# Patient Record
Sex: FEMALE | Race: OTHER | HISPANIC OR LATINO | URBAN - METROPOLITAN AREA
[De-identification: names, ages, dates, MRNs, and addresses within clinical notes are randomized per-mention and may not be internally consistent; named-entity substitution may affect disease eponyms.]

---

## 2018-06-25 ENCOUNTER — EMERGENCY (EMERGENCY)
Facility: HOSPITAL | Age: 83
LOS: 1 days | Discharge: HOME | End: 2018-06-25
Attending: EMERGENCY MEDICINE
Payer: MEDICAID

## 2018-06-25 VITALS
RESPIRATION RATE: 16 BRPM | HEART RATE: 78 BPM | SYSTOLIC BLOOD PRESSURE: 129 MMHG | TEMPERATURE: 98 F | DIASTOLIC BLOOD PRESSURE: 68 MMHG | OXYGEN SATURATION: 98 %

## 2018-06-25 VITALS
OXYGEN SATURATION: 96 % | RESPIRATION RATE: 20 BRPM | SYSTOLIC BLOOD PRESSURE: 117 MMHG | HEART RATE: 83 BPM | DIASTOLIC BLOOD PRESSURE: 63 MMHG | TEMPERATURE: 99 F

## 2018-06-25 DIAGNOSIS — Z95.0 PRESENCE OF CARDIAC PACEMAKER: Chronic | ICD-10-CM

## 2018-06-25 LAB
ALBUMIN SERPL ELPH-MCNC: 4.2 G/DL — SIGNIFICANT CHANGE UP (ref 3.3–5.2)
ALP SERPL-CCNC: 81 U/L — SIGNIFICANT CHANGE UP (ref 40–120)
ALT FLD-CCNC: 7 U/L — SIGNIFICANT CHANGE UP
ANION GAP SERPL CALC-SCNC: 17 MMOL/L — SIGNIFICANT CHANGE UP (ref 5–17)
APTT BLD: 28 SEC — SIGNIFICANT CHANGE UP (ref 27.5–37.4)
AST SERPL-CCNC: 16 U/L — SIGNIFICANT CHANGE UP
BASOPHILS # BLD AUTO: 0 K/UL — SIGNIFICANT CHANGE UP (ref 0–0.2)
BASOPHILS NFR BLD AUTO: 0.1 % — SIGNIFICANT CHANGE UP (ref 0–2)
BILIRUB SERPL-MCNC: 0.5 MG/DL — SIGNIFICANT CHANGE UP (ref 0.4–2)
BUN SERPL-MCNC: 12 MG/DL — SIGNIFICANT CHANGE UP (ref 8–20)
CALCIUM SERPL-MCNC: 9.3 MG/DL — SIGNIFICANT CHANGE UP (ref 8.6–10.2)
CHLORIDE SERPL-SCNC: 99 MMOL/L — SIGNIFICANT CHANGE UP (ref 98–107)
CO2 SERPL-SCNC: 22 MMOL/L — SIGNIFICANT CHANGE UP (ref 22–29)
CREAT SERPL-MCNC: 0.56 MG/DL — SIGNIFICANT CHANGE UP (ref 0.5–1.3)
EOSINOPHIL # BLD AUTO: 0 K/UL — SIGNIFICANT CHANGE UP (ref 0–0.5)
EOSINOPHIL NFR BLD AUTO: 0.2 % — SIGNIFICANT CHANGE UP (ref 0–6)
GLUCOSE SERPL-MCNC: 102 MG/DL — SIGNIFICANT CHANGE UP (ref 70–115)
HCT VFR BLD CALC: 35 % — LOW (ref 37–47)
HGB BLD-MCNC: 11.5 G/DL — LOW (ref 12–16)
INR BLD: 0.99 RATIO — SIGNIFICANT CHANGE UP (ref 0.88–1.16)
LYMPHOCYTES # BLD AUTO: 0.8 K/UL — LOW (ref 1–4.8)
LYMPHOCYTES # BLD AUTO: 7.4 % — LOW (ref 20–55)
MCHC RBC-ENTMCNC: 29.3 PG — SIGNIFICANT CHANGE UP (ref 27–31)
MCHC RBC-ENTMCNC: 32.9 G/DL — SIGNIFICANT CHANGE UP (ref 32–36)
MCV RBC AUTO: 89.1 FL — SIGNIFICANT CHANGE UP (ref 81–99)
MONOCYTES # BLD AUTO: 1 K/UL — HIGH (ref 0–0.8)
MONOCYTES NFR BLD AUTO: 8.5 % — SIGNIFICANT CHANGE UP (ref 3–10)
NEUTROPHILS # BLD AUTO: 9.5 K/UL — HIGH (ref 1.8–8)
NEUTROPHILS NFR BLD AUTO: 83.6 % — HIGH (ref 37–73)
PLATELET # BLD AUTO: 301 K/UL — SIGNIFICANT CHANGE UP (ref 150–400)
POTASSIUM SERPL-MCNC: 4.2 MMOL/L — SIGNIFICANT CHANGE UP (ref 3.5–5.3)
POTASSIUM SERPL-SCNC: 4.2 MMOL/L — SIGNIFICANT CHANGE UP (ref 3.5–5.3)
PROT SERPL-MCNC: 7.4 G/DL — SIGNIFICANT CHANGE UP (ref 6.6–8.7)
PROTHROM AB SERPL-ACNC: 10.9 SEC — SIGNIFICANT CHANGE UP (ref 9.8–12.7)
RBC # BLD: 3.93 M/UL — LOW (ref 4.4–5.2)
RBC # FLD: 14.9 % — SIGNIFICANT CHANGE UP (ref 11–15.6)
SODIUM SERPL-SCNC: 138 MMOL/L — SIGNIFICANT CHANGE UP (ref 135–145)
TROPONIN T SERPL-MCNC: <0.01 NG/ML — SIGNIFICANT CHANGE UP (ref 0–0.06)
TROPONIN T SERPL-MCNC: <0.01 NG/ML — SIGNIFICANT CHANGE UP (ref 0–0.06)
WBC # BLD: 11.4 K/UL — HIGH (ref 4.8–10.8)
WBC # FLD AUTO: 11.4 K/UL — HIGH (ref 4.8–10.8)

## 2018-06-25 PROCEDURE — 71046 X-RAY EXAM CHEST 2 VIEWS: CPT | Mod: 26

## 2018-06-25 PROCEDURE — 99285 EMERGENCY DEPT VISIT HI MDM: CPT

## 2018-06-25 PROCEDURE — T1013: CPT

## 2018-06-25 PROCEDURE — 96374 THER/PROPH/DIAG INJ IV PUSH: CPT

## 2018-06-25 PROCEDURE — 99284 EMERGENCY DEPT VISIT MOD MDM: CPT | Mod: 25

## 2018-06-25 PROCEDURE — 80053 COMPREHEN METABOLIC PANEL: CPT

## 2018-06-25 PROCEDURE — 85730 THROMBOPLASTIN TIME PARTIAL: CPT

## 2018-06-25 PROCEDURE — 85610 PROTHROMBIN TIME: CPT

## 2018-06-25 PROCEDURE — 93005 ELECTROCARDIOGRAM TRACING: CPT

## 2018-06-25 PROCEDURE — 84484 ASSAY OF TROPONIN QUANT: CPT

## 2018-06-25 PROCEDURE — 71046 X-RAY EXAM CHEST 2 VIEWS: CPT

## 2018-06-25 PROCEDURE — 93010 ELECTROCARDIOGRAM REPORT: CPT | Mod: 76

## 2018-06-25 PROCEDURE — 36415 COLL VENOUS BLD VENIPUNCTURE: CPT

## 2018-06-25 PROCEDURE — 85027 COMPLETE CBC AUTOMATED: CPT

## 2018-06-25 RX ORDER — LIDOCAINE 4 G/100G
10 CREAM TOPICAL ONCE
Qty: 0 | Refills: 0 | Status: COMPLETED | OUTPATIENT
Start: 2018-06-25 | End: 2018-06-25

## 2018-06-25 RX ORDER — ACETAMINOPHEN 500 MG
975 TABLET ORAL ONCE
Qty: 0 | Refills: 0 | Status: COMPLETED | OUTPATIENT
Start: 2018-06-25 | End: 2018-06-25

## 2018-06-25 RX ORDER — OMEPRAZOLE 10 MG/1
1 CAPSULE, DELAYED RELEASE ORAL
Qty: 30 | Refills: 0 | OUTPATIENT
Start: 2018-06-25 | End: 2018-07-24

## 2018-06-25 RX ORDER — ASPIRIN/CALCIUM CARB/MAGNESIUM 324 MG
324 TABLET ORAL ONCE
Qty: 0 | Refills: 0 | Status: DISCONTINUED | OUTPATIENT
Start: 2018-06-25 | End: 2018-06-25

## 2018-06-25 RX ORDER — SODIUM CHLORIDE 9 MG/ML
3 INJECTION INTRAMUSCULAR; INTRAVENOUS; SUBCUTANEOUS ONCE
Qty: 0 | Refills: 0 | Status: COMPLETED | OUTPATIENT
Start: 2018-06-25 | End: 2018-06-25

## 2018-06-25 RX ORDER — PANTOPRAZOLE SODIUM 20 MG/1
40 TABLET, DELAYED RELEASE ORAL ONCE
Qty: 0 | Refills: 0 | Status: COMPLETED | OUTPATIENT
Start: 2018-06-25 | End: 2018-06-25

## 2018-06-25 RX ADMIN — PANTOPRAZOLE SODIUM 40 MILLIGRAM(S): 20 TABLET, DELAYED RELEASE ORAL at 12:22

## 2018-06-25 RX ADMIN — Medication 30 MILLILITER(S): at 12:22

## 2018-06-25 RX ADMIN — LIDOCAINE 10 MILLILITER(S): 4 CREAM TOPICAL at 12:22

## 2018-06-25 RX ADMIN — Medication 975 MILLIGRAM(S): at 12:23

## 2018-06-25 RX ADMIN — SODIUM CHLORIDE 3 MILLILITER(S): 9 INJECTION INTRAMUSCULAR; INTRAVENOUS; SUBCUTANEOUS at 12:27

## 2018-06-25 NOTE — ED ADULT NURSE NOTE - PMH
CAD (coronary artery disease), native coronary artery  [facility] of 9/24/2014: mLAD 40% (FFR 0.89), OM3 30%, pRCA 20%  Chronic diastolic heart failure    Depression    Hypertension    PAF (paroxysmal atrial fibrillation)    Respiratory infection

## 2018-06-25 NOTE — ED PROVIDER NOTE - PHYSICAL EXAMINATION
VITALS: reviewed  GEN: NAD, A & O x 4  HEAD/EYES: NCAT, PERRL, EOMI, anicteric sclerae, no conjunctival pallor  ENT: mucus membranes moist, oropharynx WNL, trachea midline, no JVD  RESP: lungs CTA with equal breath sounds bilaterally, chest wall nontender and atraumatic. no respiratory distress  CV: heart with reg rhythm S1, S2, no murmur; distal pulses intact and symmetric bilaterally  ABDOMEN: minimal epigastric tenderness  : no CVAT  MSK: extremities atraumatic and nontender, no edema, no asymmetry. the back is without midline or lateral tenderness, there is no spinal deformity or stepoff and the back is ranged painlessly. the neck has no midline tenderness, deformity, or stepoff, and is ranged painlessly. chest wall tenderness reproducible with palpation and movement  SKIN: warm, dry, no rash, no bruising, no cyanosis. color appropriate for ethnicity. pacemaker right upper chest. no sternotomy scar  NEURO: alert, mentating appropriately, no facial asymmetry. gross sensation, motor, coordination are intact  PSYCH: Affect appropriate VITALS: reviewed and reassuring  GEN: NAD, A & O x 4  HEAD/EYES: NCAT, PERRL, EOMI, anicteric sclerae, no conjunctival pallor  ENT: mucus membranes moist, oropharynx WNL, trachea midline, no JVD  RESP: lungs CTA with equal breath sounds bilaterally, chest wall diffusely tender reproducing pain of complaint, no rash or signs of trauma, no respiratory distress  CV: heart with reg rhythm S1, S2, no murmur; distal pulses intact and symmetric bilaterally  ABDOMEN: minimal epigastric tenderness, soft, no rebound, no guarding, negative sharma's sign, no masses  : no CVAT  MSK: extremities atraumatic and nontender, no edema, no asymmetry. the back is without midline or lateral tenderness, there is no spinal deformity or stepoff and the back is ranged painlessly. the neck has no midline tenderness, deformity, or stepoff, and is ranged painlessly. chest wall tenderness reproducible with palpation and movement  SKIN: warm, dry, no rash, no bruising, no cyanosis. color appropriate for ethnicity. pacemaker right upper chest. no sternotomy scar  NEURO: alert, mentating appropriately, no facial asymmetry. gross sensation, motor, coordination are intact  PSYCH: Affect appropriate

## 2018-06-25 NOTE — ED ADULT NURSE NOTE - OBJECTIVE STATEMENT
Patient c/o right sided chest and head pain, nausea.  Initially felt epigastric pain, the complained of chest pain/dizziness. "Saw everything black".  A&Ox3, no respiratory distress, respirations even and unlabored.

## 2018-06-25 NOTE — CONSULT NOTE ADULT - SUBJECTIVE AND OBJECTIVE BOX
[place] CARDIOLOGY/EP                                                        [facility]                                                             Office: [address] [address]                                                            Telephone: [phone]. Fax:[phone]          Patient is a 88y old  Female who presents with a chief complaint of     HPI:  87 y/o F pt with hx of CAD ( non-obstructive CAD on cardiac cath 2014) , heart failure, h/o MDT pacemaker 2015 with subsequent PPM pocket infection resulting in left system explant (MRSA +) and subsequent re-implant on right side ( patient with limited cardiology follow-up)  , HTN, PAF presents to ED c/o abdominal pain and then chest pain today. While she was in the kitchen today he felt dizzy and everything went black ( for a few seconds . Patient denies post-ictal confusion , bowel or bladder incontinence    Pt currently c/o headache. Pt ate today. Denies fever, chills, diarrhea, melena      Patient denies orthopnea PND, LE edema , syncope or pre-syncope  Functional status > 10 mets  No limitations with AODL  Co-morbid: (-) DM, (_) CVA, (_) COPD (-) PE (-) DVT (-) Bleeding or clotting disorders  ECG: AsVp with ns st twa  no phenotypic evidence of Brs, HCM ,LQTS      PAST MEDICAL & SURGICAL HISTORY:  CAD (coronary artery disease), native coronary artery: [facility] of 9/24/2014: mLAD 40% (FFR 0.89), OM3 30%, pRCA 20%  PAF (paroxysmal atrial fibrillation)  Chronic diastolic heart failure  Depression  Respiratory infection  Hypertension  S/P placement of cardiac pacemaker: Medtronic dual chamber pacemaker (DDDR )      PREVIOUS DIAGNOSTIC TESTING:      ECHO  FINDINGS:< from: TTE Echo Limited or F/U (09.25.15 @ 09:08) >     IMPRESSION:  Summary:   1. Technically limited study.   2. Normal global left ventricular systolic function.   3. Left ventricular ejection fraction, by visual estimation, is 70 to   75%.   4. Mildly increased LV wall thickness.   5. Spectral Doppler shows impaired relaxation pattern of left   ventricular myocardial filling (Grade I diastolic dysfunction).   6. There is no evidence of pericardial effusion.   7. Mild mitral valve regurgitation.   8. Sclerotic aortic valve with normal opening.   9. Mild to moderate aortic regurgitation.  10. Mild-moderate tricuspid regurgitation.  11. Estimated pulmonary artery systolic pressure is 47.5 mmHg assuming a   right atrial pressure of 3 mmHg, which is consistent with mild pulmonary   hypertension.  12. There is a significant pericardial fat pad present.    < end of copied text >      < from: Cardiac Cath Lab - Adult (09.24.14 @ 17:01) >  CORONARY VESSELS: The coronary circulation is right dominant.  LM:   --  LM: Normal.  LAD:   --  Mid LAD: There was a 40 % stenosis. FFR 0.89  CX:   --  OM3: There was a 30 % stenosis.  RCA:   --  Proximal RCA: There was a 20 % stenosis.  COMPLICATIONS: No complications occurred during the cath lab visit.  DIAGNOSTIC IMPRESSIONS: Mild to moderate CAD.    < end of copied text >    FINDINGS:      Allergies    No Known Allergies    Intolerances        MEDICATIONS  (STANDING):    MEDICATIONS  (PRN):      FAMILY HISTORY:  Family history of heart disease      SOCIAL HISTORY:Lives with family    CIGARETTES:None    ALCOHOL:None    REVIEW OF SYSTEMS:  CONSTITUTIONAL: No fever, weight loss, or fatigue  EYES: No eye pain, visual disturbances, or discharge  ENMT:  No difficulty hearing, tinnitus, vertigo; No sinus or throat pain  NECK: No pain or stiffness  RESPIRATORY: No cough, wheezing, chills or hemoptysis; No Shortness of Breath  CARDIOVASCULAR: No chest pain, palpitations, passing out, dizziness, or leg swelling  GASTROINTESTINAL: No abdominal or epigastric pain. No nausea, vomiting, or hematemesis; No diarrhea or constipation. No melena or hematochezia.  GENITOURINARY: No dysuria, frequency, hematuria, or incontinence  NEUROLOGICAL: No headaches, memory loss, loss of strength, numbness, or tremors  SKIN: No itching, burning, rashes, or lesions   LYMPH Nodes: No enlarged glands  ENDOCRINE: No heat or cold intolerance; No hair loss  MUSCULOSKELETAL: No joint pain or swelling; No muscle, back, or extremity pain  PSYCHIATRIC: No depression, anxiety, mood swings, or difficulty sleeping  HEME/LYMPH: No easy bruising, or bleeding gums  ALLERY AND IMMUNOLOGIC: No hives or eczema	    Vital Signs Last 24 Hrs  T(C): 36.8 (25 Jun 2018 11:10), Max: 37.1 (25 Jun 2018 11:08)  T(F): 98.3 (25 Jun 2018 11:10), Max: 98.7 (25 Jun 2018 11:08)  HR: 81 (25 Jun 2018 11:10) (81 - 83)  BP: 117/63 (25 Jun 2018 11:10) (117/63 - 117/63)  BP(mean): --  RR: 20 (25 Jun 2018 11:10) (20 - 20)  SpO2: 98% (25 Jun 2018 11:10) (96% - 98%)    Daily Height in cm: 167.64 (25 Jun 2018 11:10)    Daily     I&O's Detail      PHYSICAL EXAM:  Appearance: Normal, well nourished	  HEENT:   Normal oral mucosa, PERRL, EOMI, sclera non-icteric	  Lymphatic: No cervical lymphadenopathy  Cardiovascular: Normal S1 S2, No JVD, No cardiac murmurs, No carotid bruits, No peripheral edema  Respiratory: Lungs clear to auscultation	  Psychiatry: A & O x 3, Mood & affect appropriate  Gastrointestinal:  Soft, Non-tender, + BS, no bruits	  Skin: No rashes, No ecchymoses, No cyanosis  Neurologic: Grossly non-focal with full strength in all four extremities  Extremities: Normal range of motion, No clubbing, cyanosis or edema  Vascular: Peripheral pulses palpable 2+ bilaterally      INTERPRETATION OF TELEMETRY:    ECG:    LABS:                        11.5   11.4  )-----------( 301      ( 25 Jun 2018 12:12 )             35.0     06-25    138  |  99  |  12.0  ----------------------------<  102  4.2   |  22.0  |  0.56    Ca    9.3      25 Jun 2018 12:12    TPro  7.4  /  Alb  4.2  /  TBili  0.5  /  DBili  x   /  AST  16  /  ALT  7   /  AlkPhos  81  06-25      CARDIAC MARKERS ( 25 Jun 2018 12:12 )  CKx     / CKMBx     / Troponin T<0.01 ng/mL /      PT/INR - ( 25 Jun 2018 12:12 )   PT: 10.9 sec;   INR: 0.99 ratio         PTT - ( 25 Jun 2018 12:12 )  PTT:28.0 sec    I&O's Summary    BNP  RADIOLOGY & ADDITIONAL STUDIES:

## 2018-06-25 NOTE — ED PROVIDER NOTE - PLAN OF CARE
You were seen and evaluated in the emergency department for chest pain and abdominal pain and feeling lightheaded. Your exam, laboratory findings, EKG, imaging and other assessments were reassuring. We did not find evidence for a dangerous cause of your symptoms. This does not mean your symptoms are not real or concerning, only that we could not find a dangerous or life-threatening cause. It is possible that you are having some stomach irritation (gastritis). We have prescribed medications for this discomfort. You may take up to 1000mg of acetaminophen (Tylenol) every 6 hours as needed for pain. Many prescription pain medications and cough medications contain acetaminophen. Avoid alcohol, spicy food, aspirin, ibuprofen and naproxen. Please read the attached information sheets as they will provide useful information regarding your condition.    It is important to understand that while your workup was reassuring, no medical workup can completely exclude all concerning conditions. Therefore, we ask that you please return if you develop new or worsening pain, trouble breathing, fainting (or feel that you might faint), weakness, inability to perform your daily activities, or other concerning new symptoms (such as listed on the attached information sheets. Please read the attached information sheets. Take your medication as prescribed.    Please be sure to follow up with your regular doctor in 2-3 days.   Please call Dr. Chen's office and schedule cardiology follow up in the next 2-3 days.

## 2018-06-25 NOTE — ED ADULT TRIAGE NOTE - CHIEF COMPLAINT QUOTE
BIBA from home with multpile complaints ,  sudden onset of SOB this morning along with chest pain radiating to both arms, lower back pain started this morning  and upper abd pain  Asprin 324mg given by EMS , R AC # 20

## 2018-06-25 NOTE — ED PROVIDER NOTE - NS ED ROS FT
CONST: no fevers, no chills, no trauma  EYES: no pain, no visual disturbances  ENT: no sore throat, no epistaxis, no rhinorrhea, no hearing changes  CV: +chest pain, no palpitations, no orthopnea, no extremity pain or swelling  RESP: no shortness of breath, no cough, no sputum, no pleurisy, no wheezing  ABD: + abdominal pain, no nausea, no vomiting, no diarrhea, no black or bloody stool  : no dysuria, no hematuria, no frequency, no urgency  MSK: no back pain, no neck pain, no extremity pain  NEURO: + headache, +dizziness +near syncope. no sensory disturbances, no focal weakness  HEME: no easy bleeding or bruising  SKIN: no diaphoresis, no rash

## 2018-06-25 NOTE — ED PROVIDER NOTE - DIAGNOSIS COUNSELING, MDM
conducted a detailed discussion... I had a detailed discussion with the patient and/or guardian regarding the historical points, exam findings, and any diagnostic results supporting the discharge  diagnosis.

## 2018-06-25 NOTE — ED PROVIDER NOTE - OBJECTIVE STATEMENT
87 y/o F pt with hx of CAD, heart failure, pacemaker, HTN, PAF presents to ED c/o abdominal pain and then chest pain today. while she was in the kitchen today he felt dizzy and everything went black. No LOC. Pt currently c/o headache. Pt ate today. Denies fever, chills, diarrhea, melena  PMD: Dr. Herman 87 y/o F pt with hx of CAD, heart failure, pacemaker, HTN, PAF presents to ED c/o abdominal pain and then chest pain today. while she was in the kitchen today shortly after eating. SHe felt epigastric burning that radiated into her chest that lasted around 5 minutes. When it was the worst she says she dizzyeverything "went black for a second." No LOC. Pt currently c/o persistent epigastric and chest pain, though much milder than before and a "very mild headache." Pt ate today immediately before onset of pain. Denies fever, chills, diarrhea, nausea, vomiting, black or blodoy stool.  PMD: Dr. Herman

## 2018-06-25 NOTE — CONSULT NOTE ADULT - ASSESSMENT
89 y/o F pt with hx of CAD ( non-obstructive CAD on cardiac cath 2014) , heart failure, h/o MDT pacemaker 2015 with subsequent PPM pocket infection resulting in left system explant (MRSA +) and subsequent re-implant on right side ( patient with limited cardiology follow-up)  , HTN, PAF presents to ED c/o abdominal pain and then chest pain today. While she was in the kitchen today he felt dizzy and everything went black ( for a few seconds . Patient denies post-ictal confusion , bowel or bladder incontinence    Pt currently c/o headache. Pt ate today. Denies fever, chills, diarrhea, melena.    Imp:    1: SSS s/p PPM with transient LOC currently hemodynamically stable , ecg SR with PACs, initial tni (-) x 1, Recc: check 2nd Tni ; PPM check normal impedance, sensing, threshold,  keep K.4 mg >2; trend tni check 2-D echo   2: CP: trend tni as above , no signs of HF on exam ,  if change in LV systolic  function may consider ischemia eval  vs outpatient w/up.  3: HTN: Bp near goal  4: PVCs: BB as bp tolerates  5: Leukocytosis : surveillance cx , UA c+s

## 2018-06-25 NOTE — ED PROVIDER NOTE - PROGRESS NOTE DETAILS
Initial workup reassuring, pt more comfortable. [place] cardiology consulted (old cardiologist Dr. Monique at [place] evaluated her no longer with practice). Dr. Chen of cardiology feels low risk for cardiac cause. Recommends 3hr trop, outpatient f/u in his office where he will reevaluate. Does not think there will be benefit to extended tele monitoring or to stress test in hospital. We concur. Pain free after GI cocktail Pain free, trops negative, EKGs stable. Pt feels improved. I have had a discussion with the patient using a . I advised the patient on probable GI source of pain, though we cannot entirely exclude cardiac causes. We will refer to cardiology for f/u later this week. She will also need to call her PCP tomorrow. She verbalized understanding. I on the usual course of this condition, home care, an appropriate schedule for follow-up, and concerning signs and symptoms that should prompt return to the emergency department. I answered all questions to the best of my ability. The patient is stable for discharge.

## 2018-06-25 NOTE — ED PROVIDER NOTE - MEDICAL DECISION MAKING DETAILS
Elderly F with known hx of CAD presents with CP and abdominal pain which are atypical and moment of near syncope. seems atypical for cardiac and reproducible. cardiac enzymes, telemetry monitoring. Give GI cocktail, lipase, LFTs, reassess will consult cardiologist as well. No significant concern for dissection or PE

## 2018-06-25 NOTE — ED PROVIDER NOTE - CARE PLAN
Principal Discharge DX:	Chest pain with low risk for cardiac etiology  Secondary Diagnosis:	Dyspepsia Principal Discharge DX:	Chest pain with low risk for cardiac etiology  Assessment and plan of treatment:	You were seen and evaluated in the emergency department for chest pain and abdominal pain and feeling lightheaded. Your exam, laboratory findings, EKG, imaging and other assessments were reassuring. We did not find evidence for a dangerous cause of your symptoms. This does not mean your symptoms are not real or concerning, only that we could not find a dangerous or life-threatening cause. It is possible that you are having some stomach irritation (gastritis). We have prescribed medications for this discomfort. You may take up to 1000mg of acetaminophen (Tylenol) every 6 hours as needed for pain. Many prescription pain medications and cough medications contain acetaminophen. Avoid alcohol, spicy food, aspirin, ibuprofen and naproxen. Please read the attached information sheets as they will provide useful information regarding your condition.    It is important to understand that while your workup was reassuring, no medical workup can completely exclude all concerning conditions. Therefore, we ask that you please return if you develop new or worsening pain, trouble breathing, fainting (or feel that you might faint), weakness, inability to perform your daily activities, or other concerning new symptoms (such as listed on the attached information sheets. Please read the attached information sheets. Take your medication as prescribed.    Please be sure to follow up with your regular doctor in 2-3 days.   Please call Dr. Chen's office and schedule cardiology follow up in the next 2-3 days.  Secondary Diagnosis:	Dyspepsia

## 2018-06-25 NOTE — ED PROVIDER NOTE - PMH
CAD (coronary artery disease), native coronary artery  [facility] of 9/24/2014: mLAD 40% (FFR 0.89), OM3 30%, pRCA 20%  Chronic diastolic heart failure    Depression    Hypertension    PAF (paroxysmal atrial fibrillation)    Respiratory infection

## 2018-06-28 ENCOUNTER — APPOINTMENT (OUTPATIENT)
Dept: CARDIOLOGY | Facility: CLINIC | Age: 83
End: 2018-06-28
Payer: MEDICAID

## 2018-06-28 ENCOUNTER — APPOINTMENT (OUTPATIENT)
Dept: CARDIOLOGY | Facility: CLINIC | Age: 83
End: 2018-06-28

## 2018-06-28 ENCOUNTER — NON-APPOINTMENT (OUTPATIENT)
Age: 83
End: 2018-06-28

## 2018-06-28 VITALS
SYSTOLIC BLOOD PRESSURE: 140 MMHG | BODY MASS INDEX: 24.37 KG/M2 | DIASTOLIC BLOOD PRESSURE: 68 MMHG | HEART RATE: 83 BPM | OXYGEN SATURATION: 96 % | WEIGHT: 142 LBS

## 2018-06-28 DIAGNOSIS — Z00.00 ENCOUNTER FOR GENERAL ADULT MEDICAL EXAMINATION W/OUT ABNORMAL FINDINGS: ICD-10-CM

## 2018-06-28 PROCEDURE — 99214 OFFICE O/P EST MOD 30 MIN: CPT

## 2018-06-28 PROCEDURE — 93000 ELECTROCARDIOGRAM COMPLETE: CPT

## 2018-06-28 RX ORDER — ALBUTEROL SULFATE 90 UG/1
108 (90 BASE) AEROSOL, METERED RESPIRATORY (INHALATION)
Qty: 18 | Refills: 0 | Status: DISCONTINUED | COMMUNITY
Start: 2018-01-20

## 2018-06-28 RX ORDER — ASPIRIN 81 MG
600-200 TABLET, DELAYED RELEASE (ENTERIC COATED) ORAL
Qty: 60 | Refills: 0 | Status: DISCONTINUED | COMMUNITY
Start: 2018-01-12

## 2018-07-05 ENCOUNTER — EMERGENCY (EMERGENCY)
Facility: HOSPITAL | Age: 83
LOS: 1 days | Discharge: HOME | End: 2018-07-05
Attending: EMERGENCY MEDICINE | Admitting: EMERGENCY MEDICINE
Payer: MEDICAID

## 2018-07-05 VITALS
TEMPERATURE: 97 F | SYSTOLIC BLOOD PRESSURE: 147 MMHG | OXYGEN SATURATION: 96 % | RESPIRATION RATE: 20 BRPM | HEART RATE: 78 BPM | DIASTOLIC BLOOD PRESSURE: 74 MMHG

## 2018-07-05 VITALS
TEMPERATURE: 99 F | DIASTOLIC BLOOD PRESSURE: 72 MMHG | SYSTOLIC BLOOD PRESSURE: 127 MMHG | OXYGEN SATURATION: 96 % | HEART RATE: 79 BPM | RESPIRATION RATE: 18 BRPM

## 2018-07-05 DIAGNOSIS — Z95.0 PRESENCE OF CARDIAC PACEMAKER: Chronic | ICD-10-CM

## 2018-07-05 LAB
ALBUMIN SERPL ELPH-MCNC: 4 G/DL — SIGNIFICANT CHANGE UP (ref 3.3–5.2)
ALP SERPL-CCNC: 82 U/L — SIGNIFICANT CHANGE UP (ref 40–120)
ALT FLD-CCNC: 9 U/L — SIGNIFICANT CHANGE UP
ANION GAP SERPL CALC-SCNC: 15 MMOL/L — SIGNIFICANT CHANGE UP (ref 5–17)
APTT BLD: 26.3 SEC — LOW (ref 27.5–37.4)
AST SERPL-CCNC: 17 U/L — SIGNIFICANT CHANGE UP
BASOPHILS # BLD AUTO: 0 K/UL — SIGNIFICANT CHANGE UP (ref 0–0.2)
BASOPHILS NFR BLD AUTO: 0.4 % — SIGNIFICANT CHANGE UP (ref 0–2)
BILIRUB SERPL-MCNC: 0.3 MG/DL — LOW (ref 0.4–2)
BUN SERPL-MCNC: 7 MG/DL — LOW (ref 8–20)
CALCIUM SERPL-MCNC: 9.2 MG/DL — SIGNIFICANT CHANGE UP (ref 8.6–10.2)
CHLORIDE SERPL-SCNC: 96 MMOL/L — LOW (ref 98–107)
CO2 SERPL-SCNC: 24 MMOL/L — SIGNIFICANT CHANGE UP (ref 22–29)
CREAT SERPL-MCNC: 0.6 MG/DL — SIGNIFICANT CHANGE UP (ref 0.5–1.3)
EOSINOPHIL # BLD AUTO: 0 K/UL — SIGNIFICANT CHANGE UP (ref 0–0.5)
EOSINOPHIL NFR BLD AUTO: 0.5 % — SIGNIFICANT CHANGE UP (ref 0–6)
GLUCOSE SERPL-MCNC: 85 MG/DL — SIGNIFICANT CHANGE UP (ref 70–115)
HCT VFR BLD CALC: 35.1 % — LOW (ref 37–47)
HGB BLD-MCNC: 11.2 G/DL — LOW (ref 12–16)
INR BLD: 1.04 RATIO — SIGNIFICANT CHANGE UP (ref 0.88–1.16)
LYMPHOCYTES # BLD AUTO: 2.3 K/UL — SIGNIFICANT CHANGE UP (ref 1–4.8)
LYMPHOCYTES # BLD AUTO: 27 % — SIGNIFICANT CHANGE UP (ref 20–55)
MCHC RBC-ENTMCNC: 29 PG — SIGNIFICANT CHANGE UP (ref 27–31)
MCHC RBC-ENTMCNC: 31.9 G/DL — LOW (ref 32–36)
MCV RBC AUTO: 90.9 FL — SIGNIFICANT CHANGE UP (ref 81–99)
MONOCYTES # BLD AUTO: 1 K/UL — HIGH (ref 0–0.8)
MONOCYTES NFR BLD AUTO: 12.2 % — HIGH (ref 3–10)
NEUTROPHILS # BLD AUTO: 5 K/UL — SIGNIFICANT CHANGE UP (ref 1.8–8)
NEUTROPHILS NFR BLD AUTO: 59.8 % — SIGNIFICANT CHANGE UP (ref 37–73)
PLATELET # BLD AUTO: 345 K/UL — SIGNIFICANT CHANGE UP (ref 150–400)
POTASSIUM SERPL-MCNC: 4.8 MMOL/L — SIGNIFICANT CHANGE UP (ref 3.5–5.3)
POTASSIUM SERPL-SCNC: 4.8 MMOL/L — SIGNIFICANT CHANGE UP (ref 3.5–5.3)
PROT SERPL-MCNC: 7.2 G/DL — SIGNIFICANT CHANGE UP (ref 6.6–8.7)
PROTHROM AB SERPL-ACNC: 11.4 SEC — SIGNIFICANT CHANGE UP (ref 9.8–12.7)
RBC # BLD: 3.86 M/UL — LOW (ref 4.4–5.2)
RBC # FLD: 14.6 % — SIGNIFICANT CHANGE UP (ref 11–15.6)
SODIUM SERPL-SCNC: 135 MMOL/L — SIGNIFICANT CHANGE UP (ref 135–145)
TROPONIN T SERPL-MCNC: <0.01 NG/ML — SIGNIFICANT CHANGE UP (ref 0–0.06)
TROPONIN T SERPL-MCNC: <0.01 NG/ML — SIGNIFICANT CHANGE UP (ref 0–0.06)
WBC # BLD: 8.4 K/UL — SIGNIFICANT CHANGE UP (ref 4.8–10.8)
WBC # FLD AUTO: 8.4 K/UL — SIGNIFICANT CHANGE UP (ref 4.8–10.8)

## 2018-07-05 PROCEDURE — 99285 EMERGENCY DEPT VISIT HI MDM: CPT

## 2018-07-05 PROCEDURE — 93010 ELECTROCARDIOGRAM REPORT: CPT

## 2018-07-05 PROCEDURE — 71045 X-RAY EXAM CHEST 1 VIEW: CPT | Mod: 26

## 2018-07-05 PROCEDURE — 99283 EMERGENCY DEPT VISIT LOW MDM: CPT

## 2018-07-05 PROCEDURE — 85610 PROTHROMBIN TIME: CPT

## 2018-07-05 PROCEDURE — 36415 COLL VENOUS BLD VENIPUNCTURE: CPT

## 2018-07-05 PROCEDURE — 71045 X-RAY EXAM CHEST 1 VIEW: CPT

## 2018-07-05 PROCEDURE — 80053 COMPREHEN METABOLIC PANEL: CPT

## 2018-07-05 PROCEDURE — T1013: CPT

## 2018-07-05 PROCEDURE — 85027 COMPLETE CBC AUTOMATED: CPT

## 2018-07-05 PROCEDURE — 85730 THROMBOPLASTIN TIME PARTIAL: CPT

## 2018-07-05 PROCEDURE — 93005 ELECTROCARDIOGRAM TRACING: CPT

## 2018-07-05 PROCEDURE — 84484 ASSAY OF TROPONIN QUANT: CPT

## 2018-07-05 RX ADMIN — Medication 100 MILLIGRAM(S): at 21:12

## 2018-07-05 NOTE — ED PROVIDER NOTE - OBJECTIVE STATEMENT
87 y/o F pt with hx of pacemaker, depression, CAD, HTN and PAF presents to the ED c/o midsternal, sharp chest pain onset 2 hours ago only when coughing. Admits to productive cough, fatigued feeling/generalized weakness and subjective chills. Saw cardiologist last week for similar symptoms but nothing was found and she was discharged. Denies fever. No further complaints at this time.

## 2018-07-05 NOTE — ED PROVIDER NOTE - CONSTITUTIONAL, MLM
normal... normal... Well appearing, well nourished, awake, alert, oriented to person, place, time/situation and in no apparent distress.

## 2018-07-05 NOTE — ED PROVIDER NOTE - PROGRESS NOTE DETAILS
Pt. re-evaluated. Pt. states that she feels better. Family states that pt. came to the ED more for weakness. Pt's chest pain atypical in nature(only when she coughs). Will repeat cardiac marker due to patient's risk factors. Family at bedside and they are will to take pt. home. Pt. also will be given something to eat. ate meal while in ED, repeat trop negative, pt still feeling well, will d/c with tessalon for cough and advise PMD f/u

## 2018-07-05 NOTE — ED PROVIDER NOTE - PMH
CAD (coronary artery disease), native coronary artery  [facility] of 9/24/2014: mLAD 40% (FFR 0.89), OM3 30%, pRCA 20%  Chronic diastolic heart failure    Depression    Hypertension    PAF (paroxysmal atrial fibrillation)    Respiratory infection

## 2018-07-05 NOTE — ED PROVIDER NOTE - INTERPRETATION
No S/T elevation, T wave inversion I AVL No S/T elevation, T wave inversion I AVL/normal sinus rhythm

## 2018-07-05 NOTE — ED ADULT NURSE NOTE - PMH
CAD (coronary artery disease), native coronary artery  [facility] of 9/24/2014: mLAD 40% (FFR 0.89), OM3 30%, pRCA 20%  Chronic diastolic heart failure    Depression    Hypertension    PAF (paroxysmal atrial fibrillation)    Respiratory infection

## 2018-07-05 NOTE — ED ADULT NURSE NOTE - OBJECTIVE STATEMENT
Assumed patient care at 1813.  Pt reports substernal chest pain started today.  Also c/o cough.  Currently mild 2/10 with no shortness of breath or nausea.  REspirations even and unlabored with clear bilateral lung sounds,.

## 2018-07-05 NOTE — ED PROVIDER NOTE - MEDICAL DECISION MAKING DETAILS
89 y/o woman with above hx presents with fatigue sore throat, chest pain with productive cough concerned with URI vs pneumonia. Will check labs, EKG and enzymes given patients history and reassess.

## 2018-07-11 ENCOUNTER — FORM ENCOUNTER (OUTPATIENT)
Age: 83
End: 2018-07-11

## 2018-07-12 ENCOUNTER — OUTPATIENT (OUTPATIENT)
Dept: OUTPATIENT SERVICES | Facility: HOSPITAL | Age: 83
LOS: 1 days | End: 2018-07-12
Payer: MEDICAID

## 2018-07-12 DIAGNOSIS — Z95.0 PRESENCE OF CARDIAC PACEMAKER: Chronic | ICD-10-CM

## 2018-07-12 DIAGNOSIS — R07.89 OTHER CHEST PAIN: ICD-10-CM

## 2018-07-12 DIAGNOSIS — I50.32 CHRONIC DIASTOLIC (CONGESTIVE) HEART FAILURE: ICD-10-CM

## 2018-07-12 PROCEDURE — 78452 HT MUSCLE IMAGE SPECT MULT: CPT | Mod: 26

## 2018-07-12 PROCEDURE — 93018 CV STRESS TEST I&R ONLY: CPT

## 2018-07-12 PROCEDURE — 93306 TTE W/DOPPLER COMPLETE: CPT

## 2018-07-12 PROCEDURE — T1013: CPT

## 2018-07-12 PROCEDURE — 93017 CV STRESS TEST TRACING ONLY: CPT

## 2018-07-12 PROCEDURE — 93016 CV STRESS TEST SUPVJ ONLY: CPT

## 2018-07-12 PROCEDURE — 78452 HT MUSCLE IMAGE SPECT MULT: CPT

## 2018-07-12 PROCEDURE — A9500: CPT

## 2018-07-16 ENCOUNTER — RESULT REVIEW (OUTPATIENT)
Age: 83
End: 2018-07-16

## 2018-07-18 ENCOUNTER — APPOINTMENT (OUTPATIENT)
Dept: ELECTROPHYSIOLOGY | Facility: CLINIC | Age: 83
End: 2018-07-18

## 2018-08-02 ENCOUNTER — APPOINTMENT (OUTPATIENT)
Dept: ELECTROPHYSIOLOGY | Facility: CLINIC | Age: 83
End: 2018-08-02
Payer: MEDICAID

## 2018-08-02 ENCOUNTER — APPOINTMENT (OUTPATIENT)
Dept: CARDIOLOGY | Facility: CLINIC | Age: 83
End: 2018-08-02
Payer: MEDICAID

## 2018-08-02 VITALS
DIASTOLIC BLOOD PRESSURE: 68 MMHG | HEART RATE: 63 BPM | HEIGHT: 64 IN | OXYGEN SATURATION: 93 % | WEIGHT: 140 LBS | SYSTOLIC BLOOD PRESSURE: 118 MMHG | BODY MASS INDEX: 23.9 KG/M2

## 2018-08-02 DIAGNOSIS — I48.0 PAROXYSMAL ATRIAL FIBRILLATION: ICD-10-CM

## 2018-08-02 DIAGNOSIS — I25.10 ATHEROSCLEROTIC HEART DISEASE OF NATIVE CORONARY ARTERY W/OUT ANGINA PECTORIS: ICD-10-CM

## 2018-08-02 DIAGNOSIS — I50.32 CHRONIC DIASTOLIC (CONGESTIVE) HEART FAILURE: ICD-10-CM

## 2018-08-02 DIAGNOSIS — I10 ESSENTIAL (PRIMARY) HYPERTENSION: ICD-10-CM

## 2018-08-02 PROCEDURE — 99214 OFFICE O/P EST MOD 30 MIN: CPT

## 2018-08-02 PROCEDURE — 93280 PM DEVICE PROGR EVAL DUAL: CPT

## 2018-08-27 ENCOUNTER — EMERGENCY (EMERGENCY)
Facility: HOSPITAL | Age: 83
LOS: 1 days | Discharge: HOME | End: 2018-08-27
Attending: EMERGENCY MEDICINE
Payer: MEDICAID

## 2018-08-27 VITALS
OXYGEN SATURATION: 95 % | HEART RATE: 76 BPM | HEIGHT: 61 IN | TEMPERATURE: 98 F | SYSTOLIC BLOOD PRESSURE: 137 MMHG | DIASTOLIC BLOOD PRESSURE: 76 MMHG | RESPIRATION RATE: 18 BRPM | WEIGHT: 145.06 LBS

## 2018-08-27 DIAGNOSIS — Z95.0 PRESENCE OF CARDIAC PACEMAKER: Chronic | ICD-10-CM

## 2018-08-27 PROCEDURE — 99284 EMERGENCY DEPT VISIT MOD MDM: CPT

## 2018-08-27 NOTE — ED ADULT TRIAGE NOTE - CHIEF COMPLAINT QUOTE
Patient A&Ox4, denies any pain or discomfort. Stated has had BLLE edema since yesterday. Has PPM. EMS reports patient ambulatory on scene.

## 2018-08-28 VITALS
TEMPERATURE: 98 F | DIASTOLIC BLOOD PRESSURE: 64 MMHG | OXYGEN SATURATION: 98 % | HEART RATE: 70 BPM | RESPIRATION RATE: 16 BRPM | SYSTOLIC BLOOD PRESSURE: 138 MMHG

## 2018-08-28 LAB
ALBUMIN SERPL ELPH-MCNC: 4.4 G/DL — SIGNIFICANT CHANGE UP (ref 3.3–5.2)
ALP SERPL-CCNC: 87 U/L — SIGNIFICANT CHANGE UP (ref 40–120)
ALT FLD-CCNC: 13 U/L — SIGNIFICANT CHANGE UP
ANION GAP SERPL CALC-SCNC: 18 MMOL/L — HIGH (ref 5–17)
AST SERPL-CCNC: 34 U/L — HIGH
BASOPHILS # BLD AUTO: 0 K/UL — SIGNIFICANT CHANGE UP (ref 0–0.2)
BASOPHILS NFR BLD AUTO: 0.4 % — SIGNIFICANT CHANGE UP (ref 0–2)
BILIRUB SERPL-MCNC: 0.3 MG/DL — LOW (ref 0.4–2)
BUN SERPL-MCNC: 10 MG/DL — SIGNIFICANT CHANGE UP (ref 8–20)
CALCIUM SERPL-MCNC: 10 MG/DL — SIGNIFICANT CHANGE UP (ref 8.6–10.2)
CHLORIDE SERPL-SCNC: 98 MMOL/L — SIGNIFICANT CHANGE UP (ref 98–107)
CO2 SERPL-SCNC: 23 MMOL/L — SIGNIFICANT CHANGE UP (ref 22–29)
CREAT SERPL-MCNC: 0.51 MG/DL — SIGNIFICANT CHANGE UP (ref 0.5–1.3)
EOSINOPHIL # BLD AUTO: 0.1 K/UL — SIGNIFICANT CHANGE UP (ref 0–0.5)
EOSINOPHIL NFR BLD AUTO: 0.7 % — SIGNIFICANT CHANGE UP (ref 0–6)
GLUCOSE SERPL-MCNC: 118 MG/DL — HIGH (ref 70–115)
HCT VFR BLD CALC: 37.9 % — SIGNIFICANT CHANGE UP (ref 37–47)
HGB BLD-MCNC: 12.5 G/DL — SIGNIFICANT CHANGE UP (ref 12–16)
LYMPHOCYTES # BLD AUTO: 2.7 K/UL — SIGNIFICANT CHANGE UP (ref 1–4.8)
LYMPHOCYTES # BLD AUTO: 29.3 % — SIGNIFICANT CHANGE UP (ref 20–55)
MCHC RBC-ENTMCNC: 29.5 PG — SIGNIFICANT CHANGE UP (ref 27–31)
MCHC RBC-ENTMCNC: 33 G/DL — SIGNIFICANT CHANGE UP (ref 32–36)
MCV RBC AUTO: 89.4 FL — SIGNIFICANT CHANGE UP (ref 81–99)
MONOCYTES # BLD AUTO: 1 K/UL — HIGH (ref 0–0.8)
MONOCYTES NFR BLD AUTO: 11 % — HIGH (ref 3–10)
NEUTROPHILS # BLD AUTO: 5.4 K/UL — SIGNIFICANT CHANGE UP (ref 1.8–8)
NEUTROPHILS NFR BLD AUTO: 58.4 % — SIGNIFICANT CHANGE UP (ref 37–73)
NT-PROBNP SERPL-SCNC: 187 PG/ML — SIGNIFICANT CHANGE UP (ref 0–300)
PLATELET # BLD AUTO: 336 K/UL — SIGNIFICANT CHANGE UP (ref 150–400)
POTASSIUM SERPL-MCNC: 4.1 MMOL/L — SIGNIFICANT CHANGE UP (ref 3.5–5.3)
POTASSIUM SERPL-SCNC: 4.1 MMOL/L — SIGNIFICANT CHANGE UP (ref 3.5–5.3)
PROT SERPL-MCNC: 7.9 G/DL — SIGNIFICANT CHANGE UP (ref 6.6–8.7)
RBC # BLD: 4.24 M/UL — LOW (ref 4.4–5.2)
RBC # FLD: 15.2 % — SIGNIFICANT CHANGE UP (ref 11–15.6)
SODIUM SERPL-SCNC: 139 MMOL/L — SIGNIFICANT CHANGE UP (ref 135–145)
TROPONIN T SERPL-MCNC: <0.01 NG/ML — SIGNIFICANT CHANGE UP (ref 0–0.06)
WBC # BLD: 9.2 K/UL — SIGNIFICANT CHANGE UP (ref 4.8–10.8)
WBC # FLD AUTO: 9.2 K/UL — SIGNIFICANT CHANGE UP (ref 4.8–10.8)

## 2018-08-28 PROCEDURE — 85027 COMPLETE CBC AUTOMATED: CPT

## 2018-08-28 PROCEDURE — 71045 X-RAY EXAM CHEST 1 VIEW: CPT | Mod: 26

## 2018-08-28 PROCEDURE — 80053 COMPREHEN METABOLIC PANEL: CPT

## 2018-08-28 PROCEDURE — 84484 ASSAY OF TROPONIN QUANT: CPT

## 2018-08-28 PROCEDURE — 83880 ASSAY OF NATRIURETIC PEPTIDE: CPT

## 2018-08-28 PROCEDURE — 36415 COLL VENOUS BLD VENIPUNCTURE: CPT

## 2018-08-28 PROCEDURE — 93005 ELECTROCARDIOGRAM TRACING: CPT

## 2018-08-28 PROCEDURE — 99284 EMERGENCY DEPT VISIT MOD MDM: CPT

## 2018-08-28 PROCEDURE — 93010 ELECTROCARDIOGRAM REPORT: CPT

## 2018-08-28 PROCEDURE — 93970 EXTREMITY STUDY: CPT

## 2018-08-28 PROCEDURE — 93970 EXTREMITY STUDY: CPT | Mod: 26

## 2018-08-28 PROCEDURE — T1013: CPT

## 2018-08-28 PROCEDURE — 71045 X-RAY EXAM CHEST 1 VIEW: CPT

## 2018-08-28 RX ORDER — FUROSEMIDE 40 MG
1 TABLET ORAL
Qty: 7 | Refills: 0 | OUTPATIENT
Start: 2018-08-28 | End: 2018-09-03

## 2018-08-28 NOTE — ED PROVIDER NOTE - PMH
CAD (coronary artery disease), native coronary artery  [facility] of 9/24/2014: mLAD 40% (FFR 0.89), OM3 30%, pRCA 20%  Chronic diastolic heart failure    Depression    Hypertension    PAF (paroxysmal atrial fibrillation)    Respiratory infection

## 2018-08-28 NOTE — ED PROVIDER NOTE - PROGRESS NOTE DETAILS
results reviewed with Pt and Pt daughter. rx lasix. Pt has follow up with cardiologist and PMD. PT verbalized understanding of diagnosis and importance of follow up at PMD. PT educated on importance of follow up and when to return to the ED.

## 2018-08-28 NOTE — ED ADULT NURSE NOTE - CHPI ED NUR SYMPTOMS NEG
no pain/no dizziness/no tingling/no chills/no weakness/no vomiting/no decreased eating/drinking/no fever/no nausea

## 2018-08-28 NOTE — ED PROVIDER NOTE - OBJECTIVE STATEMENT
88 yo F pt, PmHx: CAD, CHF, depression HTN, PAF, pacemaker, presents to ED complaining of b/l lower extremity swelling x 1 day. Pt states she has swelling in both lower legs, with associated cough (5 months). Pt denies recent trauma, calf pain, abdominal pain, numbness, weakness, SOB, chest pain, fever, and any other acute symptoms at this time.   PMD: Carrie

## 2018-08-28 NOTE — ED ADULT NURSE NOTE - OBJECTIVE STATEMENT
89 yo female c/o ble edema that started yesterday. slight edema noted to BLE with + pedal pulses, feet warm and dry, color normal, sensation intact, cap refil <3. pt states she does not have any pmh except for a ppm yet is on several medications. pt also states she has a productive cough producing thick white phlegm. phlegm not seen by this writer.

## 2018-08-28 NOTE — ED ADULT NURSE NOTE - NSIMPLEMENTINTERV_GEN_ALL_ED
Implemented All Universal Safety Interventions:  [place] to call system. Call bell, personal items and telephone within reach. Instruct patient to call for assistance. Room bathroom lighting operational. Non-slip footwear when patient is off stretcher. Physically safe environment: no spills, clutter or unnecessary equipment. Stretcher in lowest position, wheels locked, appropriate side rails in place.

## 2018-08-28 NOTE — ED ADULT NURSE NOTE - PMH
CAD (coronary artery disease), native coronary artery  [facility] of 9/24/2014: mLAD 40% (FFR 0.89), OM3 30%, pRCA 20%  Chronic diastolic heart failure    Depression    Hypertension    PAF (paroxysmal atrial fibrillation)    Respiratory infection

## 2018-08-28 NOTE — ED PROVIDER NOTE - ATTENDING CONTRIBUTION TO CARE
I personally saw the patient with the PA, and completed the key components of the history and physical exam. I then discussed the management plan with the PA.   gen in nad resp clear cardiac no murmur abd soft eteremties mild pedal edeam with nrom and nml pulses neurovasc intact

## 2018-08-28 NOTE — ED PROVIDER NOTE - CHPI ED SYMPTOMS NEG
no chills/no decreased eating/drinking/no pain/no tingling/no weakness/no dizziness/no fever/no numbness/no vomiting/no nausea

## 2019-01-25 ENCOUNTER — INPATIENT (INPATIENT)
Facility: HOSPITAL | Age: 84
LOS: 4 days | Discharge: HOME | DRG: 194 | End: 2019-01-30
Attending: INTERNAL MEDICINE | Admitting: HOSPITALIST
Payer: MEDICAID

## 2019-01-25 VITALS
RESPIRATION RATE: 20 BRPM | OXYGEN SATURATION: 97 % | SYSTOLIC BLOOD PRESSURE: 197 MMHG | DIASTOLIC BLOOD PRESSURE: 85 MMHG | TEMPERATURE: 100 F | HEART RATE: 100 BPM

## 2019-01-25 DIAGNOSIS — Z95.0 PRESENCE OF CARDIAC PACEMAKER: Chronic | ICD-10-CM

## 2019-01-25 LAB
ALBUMIN SERPL ELPH-MCNC: 4.6 G/DL — SIGNIFICANT CHANGE UP (ref 3.3–5.2)
ALP SERPL-CCNC: 89 U/L — SIGNIFICANT CHANGE UP (ref 40–120)
ALT FLD-CCNC: 12 U/L — SIGNIFICANT CHANGE UP
ANION GAP SERPL CALC-SCNC: 14 MMOL/L — SIGNIFICANT CHANGE UP (ref 5–17)
APTT BLD: 27.3 SEC — LOW (ref 27.5–36.3)
AST SERPL-CCNC: 23 U/L — SIGNIFICANT CHANGE UP
BASOPHILS # BLD AUTO: 0 K/UL — SIGNIFICANT CHANGE UP (ref 0–0.2)
BASOPHILS NFR BLD AUTO: 0.2 % — SIGNIFICANT CHANGE UP (ref 0–2)
BILIRUB SERPL-MCNC: 0.4 MG/DL — SIGNIFICANT CHANGE UP (ref 0.4–2)
BUN SERPL-MCNC: 7 MG/DL — LOW (ref 8–20)
CALCIUM SERPL-MCNC: 9.3 MG/DL — SIGNIFICANT CHANGE UP (ref 8.6–10.2)
CHLORIDE SERPL-SCNC: 91 MMOL/L — LOW (ref 98–107)
CO2 SERPL-SCNC: 27 MMOL/L — SIGNIFICANT CHANGE UP (ref 22–29)
CREAT SERPL-MCNC: 0.53 MG/DL — SIGNIFICANT CHANGE UP (ref 0.5–1.3)
EOSINOPHIL # BLD AUTO: 0 K/UL — SIGNIFICANT CHANGE UP (ref 0–0.5)
EOSINOPHIL NFR BLD AUTO: 0 % — SIGNIFICANT CHANGE UP (ref 0–6)
FLUAV H3 RNA SPEC QL NAA+PROBE: DETECTED
GLUCOSE SERPL-MCNC: 135 MG/DL — HIGH (ref 70–115)
HCT VFR BLD CALC: 36.9 % — LOW (ref 37–47)
HGB BLD-MCNC: 12 G/DL — SIGNIFICANT CHANGE UP (ref 12–16)
INR BLD: 1.06 RATIO — SIGNIFICANT CHANGE UP (ref 0.88–1.16)
LYMPHOCYTES # BLD AUTO: 0.8 K/UL — LOW (ref 1–4.8)
LYMPHOCYTES # BLD AUTO: 8.4 % — LOW (ref 20–55)
MCHC RBC-ENTMCNC: 28.3 PG — SIGNIFICANT CHANGE UP (ref 27–31)
MCHC RBC-ENTMCNC: 32.5 G/DL — SIGNIFICANT CHANGE UP (ref 32–36)
MCV RBC AUTO: 87 FL — SIGNIFICANT CHANGE UP (ref 81–99)
MONOCYTES # BLD AUTO: 0.8 K/UL — SIGNIFICANT CHANGE UP (ref 0–0.8)
MONOCYTES NFR BLD AUTO: 8.7 % — SIGNIFICANT CHANGE UP (ref 3–10)
NEUTROPHILS # BLD AUTO: 7.4 K/UL — SIGNIFICANT CHANGE UP (ref 1.8–8)
NEUTROPHILS NFR BLD AUTO: 82.5 % — HIGH (ref 37–73)
PLATELET # BLD AUTO: 247 K/UL — SIGNIFICANT CHANGE UP (ref 150–400)
POTASSIUM SERPL-MCNC: 3.5 MMOL/L — SIGNIFICANT CHANGE UP (ref 3.5–5.3)
POTASSIUM SERPL-SCNC: 3.5 MMOL/L — SIGNIFICANT CHANGE UP (ref 3.5–5.3)
PROT SERPL-MCNC: 7.7 G/DL — SIGNIFICANT CHANGE UP (ref 6.6–8.7)
PROTHROM AB SERPL-ACNC: 12.2 SEC — SIGNIFICANT CHANGE UP (ref 10–12.9)
RAPID RVP RESULT: DETECTED
RBC # BLD: 4.24 M/UL — LOW (ref 4.4–5.2)
RBC # FLD: 15.3 % — SIGNIFICANT CHANGE UP (ref 11–15.6)
SODIUM SERPL-SCNC: 132 MMOL/L — LOW (ref 135–145)
WBC # BLD: 8.9 K/UL — SIGNIFICANT CHANGE UP (ref 4.8–10.8)
WBC # FLD AUTO: 8.9 K/UL — SIGNIFICANT CHANGE UP (ref 4.8–10.8)

## 2019-01-25 PROCEDURE — 71250 CT THORAX DX C-: CPT | Mod: 26

## 2019-01-25 PROCEDURE — 71046 X-RAY EXAM CHEST 2 VIEWS: CPT | Mod: 26

## 2019-01-25 PROCEDURE — 71045 X-RAY EXAM CHEST 1 VIEW: CPT | Mod: 26

## 2019-01-25 PROCEDURE — 93010 ELECTROCARDIOGRAM REPORT: CPT

## 2019-01-25 PROCEDURE — 99285 EMERGENCY DEPT VISIT HI MDM: CPT

## 2019-01-25 RX ORDER — ALBUTEROL 90 UG/1
2.5 AEROSOL, METERED ORAL ONCE
Qty: 0 | Refills: 0 | Status: COMPLETED | OUTPATIENT
Start: 2019-01-25 | End: 2019-01-25

## 2019-01-25 RX ORDER — ATORVASTATIN CALCIUM 80 MG/1
10 TABLET, FILM COATED ORAL AT BEDTIME
Qty: 0 | Refills: 0 | Status: DISCONTINUED | OUTPATIENT
Start: 2019-01-25 | End: 2019-01-30

## 2019-01-25 RX ORDER — FUROSEMIDE 40 MG
40 TABLET ORAL DAILY
Qty: 0 | Refills: 0 | Status: DISCONTINUED | OUTPATIENT
Start: 2019-01-26 | End: 2019-01-30

## 2019-01-25 RX ORDER — HEPARIN SODIUM 5000 [USP'U]/ML
5000 INJECTION INTRAVENOUS; SUBCUTANEOUS EVERY 12 HOURS
Qty: 0 | Refills: 0 | Status: DISCONTINUED | OUTPATIENT
Start: 2019-01-25 | End: 2019-01-30

## 2019-01-25 RX ORDER — IPRATROPIUM/ALBUTEROL SULFATE 18-103MCG
3 AEROSOL WITH ADAPTER (GRAM) INHALATION ONCE
Qty: 0 | Refills: 0 | Status: COMPLETED | OUTPATIENT
Start: 2019-01-25 | End: 2019-01-25

## 2019-01-25 RX ORDER — SODIUM CHLORIDE 9 MG/ML
2000 INJECTION, SOLUTION INTRAVENOUS ONCE
Qty: 0 | Refills: 0 | Status: COMPLETED | OUTPATIENT
Start: 2019-01-25 | End: 2019-01-25

## 2019-01-25 RX ORDER — AMLODIPINE BESYLATE 2.5 MG/1
10 TABLET ORAL DAILY
Qty: 0 | Refills: 0 | Status: DISCONTINUED | OUTPATIENT
Start: 2019-01-25 | End: 2019-01-30

## 2019-01-25 RX ORDER — HYDRALAZINE HCL 50 MG
50 TABLET ORAL
Qty: 0 | Refills: 0 | Status: DISCONTINUED | OUTPATIENT
Start: 2019-01-25 | End: 2019-01-30

## 2019-01-25 RX ORDER — TIOTROPIUM BROMIDE 18 UG/1
1 CAPSULE ORAL; RESPIRATORY (INHALATION) DAILY
Qty: 0 | Refills: 0 | Status: DISCONTINUED | OUTPATIENT
Start: 2019-01-25 | End: 2019-01-30

## 2019-01-25 RX ORDER — ALBUTEROL 90 UG/1
1 AEROSOL, METERED ORAL EVERY 4 HOURS
Qty: 0 | Refills: 0 | Status: DISCONTINUED | OUTPATIENT
Start: 2019-01-25 | End: 2019-01-30

## 2019-01-25 RX ORDER — VANCOMYCIN HCL 1 G
1000 VIAL (EA) INTRAVENOUS ONCE
Qty: 0 | Refills: 0 | Status: COMPLETED | OUTPATIENT
Start: 2019-01-25 | End: 2019-01-25

## 2019-01-25 RX ORDER — ACETAMINOPHEN 500 MG
650 TABLET ORAL EVERY 6 HOURS
Qty: 0 | Refills: 0 | Status: DISCONTINUED | OUTPATIENT
Start: 2019-01-25 | End: 2019-01-30

## 2019-01-25 RX ORDER — PIPERACILLIN AND TAZOBACTAM 4; .5 G/20ML; G/20ML
3.38 INJECTION, POWDER, LYOPHILIZED, FOR SOLUTION INTRAVENOUS ONCE
Qty: 0 | Refills: 0 | Status: COMPLETED | OUTPATIENT
Start: 2019-01-25 | End: 2019-01-25

## 2019-01-25 RX ORDER — ACETAMINOPHEN 500 MG
650 TABLET ORAL ONCE
Qty: 0 | Refills: 0 | Status: COMPLETED | OUTPATIENT
Start: 2019-01-25 | End: 2019-01-25

## 2019-01-25 RX ORDER — IPRATROPIUM/ALBUTEROL SULFATE 18-103MCG
3 AEROSOL WITH ADAPTER (GRAM) INHALATION EVERY 6 HOURS
Qty: 0 | Refills: 0 | Status: DISCONTINUED | OUTPATIENT
Start: 2019-01-25 | End: 2019-01-26

## 2019-01-25 RX ORDER — PANTOPRAZOLE SODIUM 20 MG/1
40 TABLET, DELAYED RELEASE ORAL
Qty: 0 | Refills: 0 | Status: DISCONTINUED | OUTPATIENT
Start: 2019-01-25 | End: 2019-01-30

## 2019-01-25 RX ORDER — ALBUTEROL 90 UG/1
2.5 AEROSOL, METERED ORAL
Qty: 0 | Refills: 0 | Status: DISCONTINUED | OUTPATIENT
Start: 2019-01-25 | End: 2019-01-26

## 2019-01-25 RX ADMIN — Medication 650 MILLIGRAM(S): at 16:46

## 2019-01-25 RX ADMIN — Medication 1000 MILLIGRAM(S): at 19:19

## 2019-01-25 RX ADMIN — Medication 75 MILLIGRAM(S): at 20:08

## 2019-01-25 RX ADMIN — SODIUM CHLORIDE 1000 MILLILITER(S): 9 INJECTION, SOLUTION INTRAVENOUS at 16:46

## 2019-01-25 RX ADMIN — PIPERACILLIN AND TAZOBACTAM 3.38 GRAM(S): 4; .5 INJECTION, POWDER, LYOPHILIZED, FOR SOLUTION INTRAVENOUS at 17:33

## 2019-01-25 RX ADMIN — Medication 3 MILLILITER(S): at 16:45

## 2019-01-25 RX ADMIN — Medication 250 MILLIGRAM(S): at 17:46

## 2019-01-25 RX ADMIN — PIPERACILLIN AND TAZOBACTAM 200 GRAM(S): 4; .5 INJECTION, POWDER, LYOPHILIZED, FOR SOLUTION INTRAVENOUS at 16:46

## 2019-01-25 RX ADMIN — ALBUTEROL 2.5 MILLIGRAM(S): 90 AEROSOL, METERED ORAL at 22:45

## 2019-01-25 NOTE — H&P ADULT - NSHPPHYSICALEXAM_GEN_ALL_CORE
General: An elderly pleasant  female lying in bed not in distress.   HEENT: AT, NC. PERRL. intact EOM. no throat erythema or exudate.   Neck: supple. no JVD.   Chest: pt. has end exp. wheeze bilaterally. no rales. no intercostal retractions.   Heart: S1,S2. RRR. no heart murmur. no edema.   Abdomen: soft. non-tender. non-distended. + BS.   Ext: no calf tenderness on either side. moves all ext. without restrictions.  Vascular : 2 + DP B/L.   Neuro: AAO x3. no focal weakness. no speech disorder.   Skin: no rash noted, warm to touch. no diaphoresis.   psych : normal affect, co-operative.

## 2019-01-25 NOTE — ED PROVIDER NOTE - OBJECTIVE STATEMENT
Pt is an 89yoF with Afib, HTN, CHF,  PPM c/o 4 days of cough, 1 day of SOB, weakness. Pt states her chest hurts when she coughs. Pt also c/o rhinorrhea, nausea, and vomited this morning.     Pt recently traveled to [facility] 1 mo ago. Pt is an 89yoF with Afib, HTN, CHF,  PPM c/o 4 days of cough, 1 day of SOB, weakness. Pt states her chest hurts when she coughs. Pt also c/o rhinorrhea, nausea, and vomited this morning.  Per granddaughter her son was sick with a cough for the past week; states he had the flu.   Pt recently traveled to [facility] 1 mo ago.

## 2019-01-25 NOTE — ED ADULT NURSE NOTE - OBJECTIVE STATEMENT
88y/o female c/o SOB and cough x 2 weeks. Pt aox4, resp labored, B/L wheezing. febrile. 95% O2Sat on room air. no further complaints at this time. Code sepsis initiated, Dr. hall at bedside.

## 2019-01-25 NOTE — ED PROVIDER NOTE - PMH
CAD (coronary artery disease), native coronary artery  [facility] of 9/24/2014: mLAD 40% (FFR 0.89), OM3 30%, pRCA 20%  Chronic diastolic heart failure    Depression    Hypertension    PAF (paroxysmal atrial fibrillation)    Respiratory infection

## 2019-01-25 NOTE — ED PROVIDER NOTE - MEDICAL DECISION MAKING DETAILS
Pt presents with cough, SOB, fever, flu like symptoms with grandson flu + last week; came as code sepsis;  plan fluids, ABX, CXR, flu swab, reassess.

## 2019-01-25 NOTE — ED ADULT TRIAGE NOTE - CHIEF COMPLAINT QUOTE
sob, productive cough, chest pains, wheezing. a and o x3. breathing even and unlabored. spo2 95 on RA. 2l placed on pt for comfort.

## 2019-01-25 NOTE — H&P ADULT - HISTORY OF PRESENT ILLNESS
89 y/o female was brought in for cough for 4 days, rhinorrhea, weakness, flu like sympt, + nausea, as per history vomited once at home. Granddaughter disclosed to ER staff that pt's son was coughing  and had flu. no cp but chest wall hurts when cough and some sob. no abd. pain. no diarrhea per history. pt. travelled to [facility] 1 month ago. In ER tmax 103. 88 y/o female was brought in for cough for 4 days, no phlegm,  rhinorrhea, weakness, flu like sympt, + nausea, as per history vomited once at home. Granddaughter disclosed to ER staff that pt's son was coughing  and had flu. no cp but chest wall hurts when cough and some sob. no abd. pain. no diarrhea per history. pt. travelled to [facility] 1 month ago. In ER tmax 103.

## 2019-01-25 NOTE — ED PROVIDER NOTE - PROGRESS NOTE DETAILS
Pt overall feeling better but still wheezing w/ coarse b/l breath sounds. D/w family decision to admit at least for close monitoring/ CT chest due to + flu A; they agree

## 2019-01-25 NOTE — H&P ADULT - ASSESSMENT
pt. is admitted for :    - Influenza A, isolation as per protocol, will be on tamiflu. got iv fluids in ER. lactate negative, cxr did not show obvious pneumonia, ct chest is ordered by R physician, report pending at the time of admission to r/o pneumonia.     - wheeze, possible bronchitis, will be on neb tx and will keep on solumedrol for now. follow up ct chest to r/o pneumonia.     - Essential HTN, continue home meds.     - chronic diastolic chf, continue lasix. pt. is admitted for :    - Influenza A, isolation as per protocol, will be on tamiflu. got iv fluids in ER. Tmax 103, blood , urine cx to follow,  lactate negative, cxr did not show obvious pneumonia, ct chest is ordered by R physician, report pending at the time of admission to r/o pneumonia.     - wheeze, possible bronchitis, will be on neb tx and will keep on solumedrol for now. follow up ct chest to r/o pneumonia.     - Essential HTN, continue home meds.     - chronic diastolic chf, continue lasix. pt. was seen by EP millie benjamin  in jun, 2018, pt. is not on AC and reason is not noted in his note, will keep pt. on aspirin, has pacemaker, may be pt. fall risk ? will request day team to inquire from family.

## 2019-01-25 NOTE — H&P ADULT - PMH
CAD (coronary artery disease), native coronary artery  [facility] of 9/24/2014: mLAD 40% (FFR 0.89), OM3 30%, pRCA 20%  Chronic diastolic heart failure    Depression    Hypertension    PAF (paroxysmal atrial fibrillation)    Respiratory infection

## 2019-01-26 DIAGNOSIS — J11.1 INFLUENZA DUE TO UNIDENTIFIED INFLUENZA VIRUS WITH OTHER RESPIRATORY MANIFESTATIONS: ICD-10-CM

## 2019-01-26 LAB
ANION GAP SERPL CALC-SCNC: 13 MMOL/L — SIGNIFICANT CHANGE UP (ref 5–17)
APPEARANCE UR: CLEAR — SIGNIFICANT CHANGE UP
BILIRUB UR-MCNC: NEGATIVE — SIGNIFICANT CHANGE UP
BUN SERPL-MCNC: 5 MG/DL — LOW (ref 8–20)
CALCIUM SERPL-MCNC: 9.3 MG/DL — SIGNIFICANT CHANGE UP (ref 8.6–10.2)
CHLORIDE SERPL-SCNC: 95 MMOL/L — LOW (ref 98–107)
CO2 SERPL-SCNC: 28 MMOL/L — SIGNIFICANT CHANGE UP (ref 22–29)
COLOR SPEC: YELLOW — SIGNIFICANT CHANGE UP
CREAT SERPL-MCNC: 0.46 MG/DL — LOW (ref 0.5–1.3)
DIFF PNL FLD: NEGATIVE — SIGNIFICANT CHANGE UP
EOSINOPHIL # BLD AUTO: 0 K/UL — SIGNIFICANT CHANGE UP (ref 0–0.5)
EOSINOPHIL NFR BLD AUTO: 0 % — SIGNIFICANT CHANGE UP (ref 0–6)
EPI CELLS # UR: SIGNIFICANT CHANGE UP
GLUCOSE SERPL-MCNC: 184 MG/DL — HIGH (ref 70–115)
GLUCOSE UR QL: NEGATIVE MG/DL — SIGNIFICANT CHANGE UP
HCT VFR BLD CALC: 35.8 % — LOW (ref 37–47)
HGB BLD-MCNC: 11.7 G/DL — LOW (ref 12–16)
KETONES UR-MCNC: NEGATIVE — SIGNIFICANT CHANGE UP
LEUKOCYTE ESTERASE UR-ACNC: NEGATIVE — SIGNIFICANT CHANGE UP
LYMPHOCYTES # BLD AUTO: 0.4 K/UL — LOW (ref 1–4.8)
LYMPHOCYTES # BLD AUTO: 5.7 % — LOW (ref 20–55)
MCHC RBC-ENTMCNC: 28.1 PG — SIGNIFICANT CHANGE UP (ref 27–31)
MCHC RBC-ENTMCNC: 32.7 G/DL — SIGNIFICANT CHANGE UP (ref 32–36)
MCV RBC AUTO: 85.9 FL — SIGNIFICANT CHANGE UP (ref 81–99)
MONOCYTES # BLD AUTO: 0.1 K/UL — SIGNIFICANT CHANGE UP (ref 0–0.8)
MONOCYTES NFR BLD AUTO: 1.3 % — LOW (ref 3–10)
NEUTROPHILS # BLD AUTO: 6.7 K/UL — SIGNIFICANT CHANGE UP (ref 1.8–8)
NEUTROPHILS NFR BLD AUTO: 92.9 % — HIGH (ref 37–73)
NITRITE UR-MCNC: NEGATIVE — SIGNIFICANT CHANGE UP
PH UR: 8 — SIGNIFICANT CHANGE UP (ref 5–8)
PLATELET # BLD AUTO: 246 K/UL — SIGNIFICANT CHANGE UP (ref 150–400)
POTASSIUM SERPL-MCNC: 3.2 MMOL/L — LOW (ref 3.5–5.3)
POTASSIUM SERPL-SCNC: 3.2 MMOL/L — LOW (ref 3.5–5.3)
PROT UR-MCNC: 15 MG/DL
RBC # BLD: 4.17 M/UL — LOW (ref 4.4–5.2)
RBC # FLD: 15 % — SIGNIFICANT CHANGE UP (ref 11–15.6)
SODIUM SERPL-SCNC: 136 MMOL/L — SIGNIFICANT CHANGE UP (ref 135–145)
SP GR SPEC: 1.01 — SIGNIFICANT CHANGE UP (ref 1.01–1.02)
UROBILINOGEN FLD QL: NEGATIVE MG/DL — SIGNIFICANT CHANGE UP
WBC # BLD: 7.2 K/UL — SIGNIFICANT CHANGE UP (ref 4.8–10.8)
WBC # FLD AUTO: 7.2 K/UL — SIGNIFICANT CHANGE UP (ref 4.8–10.8)

## 2019-01-26 PROCEDURE — 99232 SBSQ HOSP IP/OBS MODERATE 35: CPT

## 2019-01-26 RX ORDER — ASPIRIN/CALCIUM CARB/MAGNESIUM 324 MG
81 TABLET ORAL DAILY
Qty: 0 | Refills: 0 | Status: DISCONTINUED | OUTPATIENT
Start: 2019-01-26 | End: 2019-01-26

## 2019-01-26 RX ORDER — MAGNESIUM SULFATE 500 MG/ML
1 VIAL (ML) INJECTION ONCE
Qty: 0 | Refills: 0 | Status: COMPLETED | OUTPATIENT
Start: 2019-01-26 | End: 2019-01-26

## 2019-01-26 RX ORDER — PIPERACILLIN AND TAZOBACTAM 4; .5 G/20ML; G/20ML
3.38 INJECTION, POWDER, LYOPHILIZED, FOR SOLUTION INTRAVENOUS EVERY 8 HOURS
Qty: 0 | Refills: 0 | Status: DISCONTINUED | OUTPATIENT
Start: 2019-01-26 | End: 2019-01-28

## 2019-01-26 RX ORDER — POTASSIUM CHLORIDE 20 MEQ
40 PACKET (EA) ORAL ONCE
Qty: 0 | Refills: 0 | Status: COMPLETED | OUTPATIENT
Start: 2019-01-26 | End: 2019-01-26

## 2019-01-26 RX ORDER — ACETYLCYSTEINE 200 MG/ML
3 VIAL (ML) MISCELLANEOUS EVERY 6 HOURS
Qty: 0 | Refills: 0 | Status: DISCONTINUED | OUTPATIENT
Start: 2019-01-26 | End: 2019-01-29

## 2019-01-26 RX ORDER — ALBUTEROL 90 UG/1
1.25 AEROSOL, METERED ORAL EVERY 6 HOURS
Qty: 0 | Refills: 0 | Status: DISCONTINUED | OUTPATIENT
Start: 2019-01-26 | End: 2019-01-26

## 2019-01-26 RX ORDER — LEVALBUTEROL 1.25 MG/.5ML
0.63 SOLUTION, CONCENTRATE RESPIRATORY (INHALATION) EVERY 6 HOURS
Qty: 0 | Refills: 0 | Status: DISCONTINUED | OUTPATIENT
Start: 2019-01-26 | End: 2019-01-29

## 2019-01-26 RX ORDER — LACTOBACILLUS ACIDOPHILUS 100MM CELL
1 CAPSULE ORAL
Qty: 0 | Refills: 0 | Status: DISCONTINUED | OUTPATIENT
Start: 2019-01-26 | End: 2019-01-30

## 2019-01-26 RX ORDER — AZITHROMYCIN 500 MG/1
500 TABLET, FILM COATED ORAL EVERY 24 HOURS
Qty: 0 | Refills: 0 | Status: DISCONTINUED | OUTPATIENT
Start: 2019-01-26 | End: 2019-01-30

## 2019-01-26 RX ORDER — PIPERACILLIN AND TAZOBACTAM 4; .5 G/20ML; G/20ML
3.38 INJECTION, POWDER, LYOPHILIZED, FOR SOLUTION INTRAVENOUS EVERY 12 HOURS
Qty: 0 | Refills: 0 | Status: DISCONTINUED | OUTPATIENT
Start: 2019-01-26 | End: 2019-01-26

## 2019-01-26 RX ORDER — CEFTRIAXONE 500 MG/1
1 INJECTION, POWDER, FOR SOLUTION INTRAMUSCULAR; INTRAVENOUS EVERY 24 HOURS
Qty: 0 | Refills: 0 | Status: DISCONTINUED | OUTPATIENT
Start: 2019-01-26 | End: 2019-01-26

## 2019-01-26 RX ORDER — ASPIRIN/CALCIUM CARB/MAGNESIUM 324 MG
325 TABLET ORAL DAILY
Qty: 0 | Refills: 0 | Status: DISCONTINUED | OUTPATIENT
Start: 2019-01-26 | End: 2019-01-30

## 2019-01-26 RX ADMIN — Medication 3 MILLILITER(S): at 15:36

## 2019-01-26 RX ADMIN — HEPARIN SODIUM 5000 UNIT(S): 5000 INJECTION INTRAVENOUS; SUBCUTANEOUS at 05:21

## 2019-01-26 RX ADMIN — Medication 75 MILLIGRAM(S): at 05:21

## 2019-01-26 RX ADMIN — LEVALBUTEROL 0.63 MILLIGRAM(S): 1.25 SOLUTION, CONCENTRATE RESPIRATORY (INHALATION) at 21:24

## 2019-01-26 RX ADMIN — Medication 40 MILLIGRAM(S): at 22:22

## 2019-01-26 RX ADMIN — AZITHROMYCIN 255 MILLIGRAM(S): 500 TABLET, FILM COATED ORAL at 01:46

## 2019-01-26 RX ADMIN — CEFTRIAXONE 100 GRAM(S): 500 INJECTION, POWDER, FOR SOLUTION INTRAMUSCULAR; INTRAVENOUS at 03:40

## 2019-01-26 RX ADMIN — SODIUM CHLORIDE 2000 MILLILITER(S): 9 INJECTION, SOLUTION INTRAVENOUS at 00:03

## 2019-01-26 RX ADMIN — ATORVASTATIN CALCIUM 10 MILLIGRAM(S): 80 TABLET, FILM COATED ORAL at 22:23

## 2019-01-26 RX ADMIN — PIPERACILLIN AND TAZOBACTAM 25 GRAM(S): 4; .5 INJECTION, POWDER, LYOPHILIZED, FOR SOLUTION INTRAVENOUS at 15:32

## 2019-01-26 RX ADMIN — Medication 3 MILLILITER(S): at 02:42

## 2019-01-26 RX ADMIN — AMLODIPINE BESYLATE 10 MILLIGRAM(S): 2.5 TABLET ORAL at 05:21

## 2019-01-26 RX ADMIN — Medication 325 MILLIGRAM(S): at 13:36

## 2019-01-26 RX ADMIN — Medication 60 MILLIGRAM(S): at 00:02

## 2019-01-26 RX ADMIN — Medication 75 MILLIGRAM(S): at 17:11

## 2019-01-26 RX ADMIN — Medication 40 MILLIGRAM(S): at 13:37

## 2019-01-26 RX ADMIN — Medication 1 TABLET(S): at 17:12

## 2019-01-26 RX ADMIN — PANTOPRAZOLE SODIUM 40 MILLIGRAM(S): 20 TABLET, DELAYED RELEASE ORAL at 05:21

## 2019-01-26 RX ADMIN — Medication 3 MILLILITER(S): at 21:25

## 2019-01-26 RX ADMIN — Medication 50 MILLIGRAM(S): at 05:21

## 2019-01-26 RX ADMIN — Medication 1 TABLET(S): at 13:37

## 2019-01-26 RX ADMIN — LEVALBUTEROL 0.63 MILLIGRAM(S): 1.25 SOLUTION, CONCENTRATE RESPIRATORY (INHALATION) at 15:36

## 2019-01-26 RX ADMIN — Medication 1200 MILLIGRAM(S): at 17:11

## 2019-01-26 RX ADMIN — Medication 50 MILLIGRAM(S): at 17:11

## 2019-01-26 RX ADMIN — Medication 40 MILLIEQUIVALENT(S): at 10:27

## 2019-01-26 RX ADMIN — Medication 1 TABLET(S): at 08:48

## 2019-01-26 RX ADMIN — PIPERACILLIN AND TAZOBACTAM 25 GRAM(S): 4; .5 INJECTION, POWDER, LYOPHILIZED, FOR SOLUTION INTRAVENOUS at 22:23

## 2019-01-26 RX ADMIN — Medication 40 MILLIGRAM(S): at 05:21

## 2019-01-26 RX ADMIN — HEPARIN SODIUM 5000 UNIT(S): 5000 INJECTION INTRAVENOUS; SUBCUTANEOUS at 17:10

## 2019-01-26 RX ADMIN — Medication 100 GRAM(S): at 10:27

## 2019-01-26 NOTE — PROGRESS NOTE ADULT - SUBJECTIVE AND OBJECTIVE BOX
HEALTH ISSUES - PROBLEM Dx:  admitted with - cough, fever  CC- influenza A, mucus plugging    INTERVAL HPI/ OVERNIGHT EVENTS:    comfortable, states she feels congested    REVIEW OF SYSTEMS:    fever - cough + sob -    Vital Signs Last 24 Hrs  T(C): 36.7 (2019 15:31), Max: 37.3 (2019 17:54)  T(F): 98 (2019 15:31), Max: 99.1 (2019 17:54)  HR: 81 (2019 15:39) (76 - 115)  BP: 122/59 (2019 15:31) (122/59 - 173/81)  BP(mean): --  RR: 17 (2019 15:31) (17 - 22)  SpO2: 98% (2019 15:39) (93% - 98%)    PHYSICAL EXAM-  General: An elderly pleasant  female lying in bed not in distress.   HEENT: AT, NC. PERRL. intact EOM. no throat erythema or exudate.   Neck: supple. no JVD.   Chest: scattered wheeze bilaterally. no rales. no intercostal retractions.   Heart: S1,S2. RRR. no heart murmur. no edema.   Abdomen: soft. non-tender. non-distended. + BS.   Ext: no calf tenderness on either side. moves all ext. without restrictions.  Vascular : 2 + DP B/L.   Neuro: AAO x3. no focal weakness. no speech disorder.   Skin: no rash noted, warm to touch. no diaphoresis.   psych : normal affect, co-operative  LABS:                        11.7   7.2   )-----------( 246      ( 2019 08:14 )             35.8     -    136  |  95<L>  |  5.0<L>  ----------------------------<  184<H>  3.2<L>   |  28.0  |  0.46<L>    Ca    9.3      2019 08:14    TPro  7.7  /  Alb  4.6  /  TBili  0.4  /  DBili  x   /  AST  23  /  ALT  12  /  AlkPhos  89      PT/INR - ( 2019 16:50 )   PT: 12.2 sec;   INR: 1.06 ratio         PTT - ( 2019 16:50 )  PTT:27.3 sec  Urinalysis Basic - ( 2019 03:13 )    Color: Yellow / Appearance: Clear / S.010 / pH: x  Gluc: x / Ketone: Negative  / Bili: Negative / Urobili: Negative mg/dL   Blood: x / Protein: 15 mg/dL / Nitrite: Negative   Leuk Esterase: Negative / RBC: x / WBC x   Sq Epi: x / Non Sq Epi: Occasional / Bacteria: x    Assessment and Plan

## 2019-01-26 NOTE — PROGRESS NOTE ADULT - ASSESSMENT
pt. is admitted for :    - Influenza A, isolation as per protocol, will be on Tamiflu.    Blood , Urine cx to follow,     CXR did not show obvious pneumonia, CT chest showed mucus plugging  Mucomyst inhalation added with xopenex    - Blood c/s x 1 bottle gram + rods  rest of 3 testing. and 1 bottle neg  - likely contaminant  -pt appears nontoxic  empirically changing from Rocephin to zosyn.  rpt bld c/s x 2 today    - Possible bronchitis sec to above, will be on neb tx    will taper solu medrol quickly    - Essential HTN, continue home meds.     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

## 2019-01-26 NOTE — PHARMACOTHERAPY INTERVENTION NOTE - COMMENTS
Patient with Gram positive rods in blood started on pip/tazo q12h. CrCl > 20 mL/min, recommended starting q8h dosing.

## 2019-01-27 LAB
ANION GAP SERPL CALC-SCNC: 20 MMOL/L — HIGH (ref 5–17)
BUN SERPL-MCNC: 17 MG/DL — SIGNIFICANT CHANGE UP (ref 8–20)
CALCIUM SERPL-MCNC: 9.2 MG/DL — SIGNIFICANT CHANGE UP (ref 8.6–10.2)
CHLORIDE SERPL-SCNC: 91 MMOL/L — LOW (ref 98–107)
CO2 SERPL-SCNC: 23 MMOL/L — SIGNIFICANT CHANGE UP (ref 22–29)
CREAT SERPL-MCNC: 0.68 MG/DL — SIGNIFICANT CHANGE UP (ref 0.5–1.3)
CULTURE RESULTS: NO GROWTH — SIGNIFICANT CHANGE UP
GLUCOSE SERPL-MCNC: 260 MG/DL — HIGH (ref 70–115)
MAGNESIUM SERPL-MCNC: 2 MG/DL — SIGNIFICANT CHANGE UP (ref 1.6–2.6)
POTASSIUM SERPL-MCNC: 3.5 MMOL/L — SIGNIFICANT CHANGE UP (ref 3.5–5.3)
POTASSIUM SERPL-SCNC: 3.5 MMOL/L — SIGNIFICANT CHANGE UP (ref 3.5–5.3)
SODIUM SERPL-SCNC: 134 MMOL/L — LOW (ref 135–145)
SPECIMEN SOURCE: SIGNIFICANT CHANGE UP

## 2019-01-27 PROCEDURE — 99232 SBSQ HOSP IP/OBS MODERATE 35: CPT

## 2019-01-27 RX ADMIN — Medication 40 MILLIGRAM(S): at 05:17

## 2019-01-27 RX ADMIN — Medication 75 MILLIGRAM(S): at 17:14

## 2019-01-27 RX ADMIN — Medication 75 MILLIGRAM(S): at 05:17

## 2019-01-27 RX ADMIN — Medication 3 MILLILITER(S): at 21:10

## 2019-01-27 RX ADMIN — Medication 1 TABLET(S): at 08:16

## 2019-01-27 RX ADMIN — AMLODIPINE BESYLATE 10 MILLIGRAM(S): 2.5 TABLET ORAL at 05:20

## 2019-01-27 RX ADMIN — PIPERACILLIN AND TAZOBACTAM 25 GRAM(S): 4; .5 INJECTION, POWDER, LYOPHILIZED, FOR SOLUTION INTRAVENOUS at 21:36

## 2019-01-27 RX ADMIN — Medication 1 TABLET(S): at 17:16

## 2019-01-27 RX ADMIN — LEVALBUTEROL 0.63 MILLIGRAM(S): 1.25 SOLUTION, CONCENTRATE RESPIRATORY (INHALATION) at 21:10

## 2019-01-27 RX ADMIN — Medication 50 MILLIGRAM(S): at 05:17

## 2019-01-27 RX ADMIN — Medication 1200 MILLIGRAM(S): at 17:14

## 2019-01-27 RX ADMIN — PANTOPRAZOLE SODIUM 40 MILLIGRAM(S): 20 TABLET, DELAYED RELEASE ORAL at 05:17

## 2019-01-27 RX ADMIN — Medication 1 TABLET(S): at 11:49

## 2019-01-27 RX ADMIN — HEPARIN SODIUM 5000 UNIT(S): 5000 INJECTION INTRAVENOUS; SUBCUTANEOUS at 05:18

## 2019-01-27 RX ADMIN — Medication 40 MILLIGRAM(S): at 13:25

## 2019-01-27 RX ADMIN — Medication 325 MILLIGRAM(S): at 11:49

## 2019-01-27 RX ADMIN — Medication 50 MILLIGRAM(S): at 17:14

## 2019-01-27 RX ADMIN — PIPERACILLIN AND TAZOBACTAM 25 GRAM(S): 4; .5 INJECTION, POWDER, LYOPHILIZED, FOR SOLUTION INTRAVENOUS at 13:25

## 2019-01-27 RX ADMIN — ATORVASTATIN CALCIUM 10 MILLIGRAM(S): 80 TABLET, FILM COATED ORAL at 21:36

## 2019-01-27 RX ADMIN — PIPERACILLIN AND TAZOBACTAM 25 GRAM(S): 4; .5 INJECTION, POWDER, LYOPHILIZED, FOR SOLUTION INTRAVENOUS at 05:17

## 2019-01-27 RX ADMIN — AZITHROMYCIN 255 MILLIGRAM(S): 500 TABLET, FILM COATED ORAL at 02:15

## 2019-01-27 RX ADMIN — HEPARIN SODIUM 5000 UNIT(S): 5000 INJECTION INTRAVENOUS; SUBCUTANEOUS at 17:14

## 2019-01-27 RX ADMIN — LEVALBUTEROL 0.63 MILLIGRAM(S): 1.25 SOLUTION, CONCENTRATE RESPIRATORY (INHALATION) at 03:39

## 2019-01-27 RX ADMIN — Medication 3 MILLILITER(S): at 03:39

## 2019-01-27 RX ADMIN — Medication 1200 MILLIGRAM(S): at 05:17

## 2019-01-27 NOTE — PROGRESS NOTE ADULT - SUBJECTIVE AND OBJECTIVE BOX
Internal Medicine Hospitalist - Dr. Luther HEREDIA    [id number]    89y      Female    Patient is a 89y old  Female who presents with a chief complaint of cough (2019 16:49)    INTERVAL HPI/ OVERNIGHT EVENTS: Patient is seen and examined, report feeling better, cough and SOB improving, afebrile    REVIEW OF SYSTEMS:    Denied fever, chills, abd. pain, nausea, vomiting, chest pain,  headache, dizziness    PHYSICAL EXAM:    Vital Signs Last 24 Hrs  T(C): 36.9 (2019 11:08), Max: 37.3 (2019 04:57)  T(F): 98.5 (2019 11:08), Max: 99.1 (2019 04:57)  HR: 77 (2019 11:08) (71 - 90)  BP: 110/55 (2019 11:08) (110/55 - 152/74)  BP(mean): --  RR: 16 (2019 11:08) (16 - 18)  SpO2: 97% (2019 04:57) (92% - 98%)    GENERAL: NAD  CHEST/LUNG: improving air entry  HEART: S1S2+ audible  ABDOMEN: Soft, Nontender, Nondistended; Bowel sounds present  EXTREMITIES:  no edema  CNS: AAO X 3  Psychiatry: normal mood    LABS:                        11.7   7.2   )-----------( 246      ( 2019 08:14 )             35.8         134<L>  |  91<L>  |  17.0  ----------------------------<  260<H>  3.5   |  23.0  |  0.68    Ca    9.2      2019 07:33  Mg     2.0         TPro  7.7  /  Alb  4.6  /  TBili  0.4  /  DBili  x   /  AST  23  /  ALT  12  /  AlkPhos  89      PT/INR - ( 2019 16:50 )   PT: 12.2 sec;   INR: 1.06 ratio         PTT - ( 2019 16:50 )  PTT:27.3 sec  Urinalysis Basic - ( 2019 03:13 )    Color: Yellow / Appearance: Clear / S.010 / pH: x  Gluc: x / Ketone: Negative  / Bili: Negative / Urobili: Negative mg/dL   Blood: x / Protein: 15 mg/dL / Nitrite: Negative   Leuk Esterase: Negative / RBC: x / WBC x   Sq Epi: x / Non Sq Epi: Occasional / Bacteria: x          MEDICATIONS  (STANDING):  acetylcysteine 10%  Inhalation 3 milliLiter(s) Inhalation every 6 hours  ALBUTerol    90 MICROgram(s) HFA Inhaler 1 Puff(s) Inhalation every 4 hours  amLODIPine   Tablet 10 milliGRAM(s) Oral daily  aspirin enteric coated 325 milliGRAM(s) Oral daily  atorvastatin 10 milliGRAM(s) Oral at bedtime  azithromycin  IVPB 500 milliGRAM(s) IV Intermittent every 24 hours  furosemide    Tablet 40 milliGRAM(s) Oral daily  guaiFENesin ER 1200 milliGRAM(s) Oral every 12 hours  heparin  Injectable 5000 Unit(s) SubCutaneous every 12 hours  hydrALAZINE 50 milliGRAM(s) Oral two times a day  lactobacillus acidophilus 1 Tablet(s) Oral three times a day with meals  levalbuterol Inhalation 0.63 milliGRAM(s) Inhalation every 6 hours  methylPREDNISolone sodium succinate Injectable 40 milliGRAM(s) IV Push every 8 hours  oseltamivir 75 milliGRAM(s) Oral two times a day  pantoprazole    Tablet 40 milliGRAM(s) Oral before breakfast  piperacillin/tazobactam IVPB. 3.375 Gram(s) IV Intermittent every 8 hours  tiotropium 18 MICROgram(s) Capsule 1 Capsule(s) Inhalation daily    MEDICATIONS  (PRN):  acetaminophen   Tablet .. 650 milliGRAM(s) Oral every 6 hours PRN Temp greater or equal to 38C (100.4F), Mild Pain (1 - 3), Moderate Pain (4 - 6)      RADIOLOGY & ADDITIONAL TEST    < from: CT Chest No Cont (19 @ 22:50) >    IMPRESSION: Right lower lobe distal airway mucoid impaction of right will   lobe subsegmental atelectasis.    < end of copied text >

## 2019-01-27 NOTE — PROGRESS NOTE ADULT - ASSESSMENT
This is 89 y/o female was brought in for  flu like symptoms, RVP positive for Flu, started on Tamiflu, CT chest showed Right lower lobe distal airway mucoid impaction of right will  lobe subsegmental atelectasis.    A/P    >Influenza A- c/w Tamiflu  isolation  CXR did not show obvious pneumonia, CT chest showed mucus plugging  Mucomyst inhalation added with xopenex  taper steroid    >Bacteremia -1 out 2 blood c/s grew bacillus species not anthracis - discussed with Dr. Benites, likely contaminant, f/u second blood c/s  pt is non toxic     > Essential HTN, continue home meds.     > hx of CAD ( non-obstructive CAD on cardiac cath 2014) , diastolic heart failure, h/o MDT  PAF   not on A/C at home- reason unknown  c/w home medication    > DVT PPX - Heparin

## 2019-01-28 LAB
ANION GAP SERPL CALC-SCNC: 16 MMOL/L — SIGNIFICANT CHANGE UP (ref 5–17)
ANION GAP SERPL CALC-SCNC: 17 MMOL/L — SIGNIFICANT CHANGE UP (ref 5–17)
BUN SERPL-MCNC: 17 MG/DL — SIGNIFICANT CHANGE UP (ref 8–20)
BUN SERPL-MCNC: 17 MG/DL — SIGNIFICANT CHANGE UP (ref 8–20)
CALCIUM SERPL-MCNC: 8.6 MG/DL — SIGNIFICANT CHANGE UP (ref 8.6–10.2)
CALCIUM SERPL-MCNC: 8.8 MG/DL — SIGNIFICANT CHANGE UP (ref 8.6–10.2)
CHLORIDE SERPL-SCNC: 90 MMOL/L — LOW (ref 98–107)
CHLORIDE SERPL-SCNC: 90 MMOL/L — LOW (ref 98–107)
CO2 SERPL-SCNC: 28 MMOL/L — SIGNIFICANT CHANGE UP (ref 22–29)
CO2 SERPL-SCNC: 29 MMOL/L — SIGNIFICANT CHANGE UP (ref 22–29)
CREAT SERPL-MCNC: 0.59 MG/DL — SIGNIFICANT CHANGE UP (ref 0.5–1.3)
CREAT SERPL-MCNC: 0.69 MG/DL — SIGNIFICANT CHANGE UP (ref 0.5–1.3)
GLUCOSE SERPL-MCNC: 160 MG/DL — HIGH (ref 70–115)
GLUCOSE SERPL-MCNC: 255 MG/DL — HIGH (ref 70–115)
HCT VFR BLD CALC: 35.4 % — LOW (ref 37–47)
HGB BLD-MCNC: 11.4 G/DL — LOW (ref 12–16)
MAGNESIUM SERPL-MCNC: 1.9 MG/DL — SIGNIFICANT CHANGE UP (ref 1.6–2.6)
MCHC RBC-ENTMCNC: 27.7 PG — SIGNIFICANT CHANGE UP (ref 27–31)
MCHC RBC-ENTMCNC: 32.2 G/DL — SIGNIFICANT CHANGE UP (ref 32–36)
MCV RBC AUTO: 86.1 FL — SIGNIFICANT CHANGE UP (ref 81–99)
PHOSPHATE SERPL-MCNC: 4 MG/DL — SIGNIFICANT CHANGE UP (ref 2.4–4.7)
PLATELET # BLD AUTO: 300 K/UL — SIGNIFICANT CHANGE UP (ref 150–400)
POTASSIUM SERPL-MCNC: 2.8 MMOL/L — CRITICAL LOW (ref 3.5–5.3)
POTASSIUM SERPL-MCNC: 3.1 MMOL/L — LOW (ref 3.5–5.3)
POTASSIUM SERPL-SCNC: 2.8 MMOL/L — CRITICAL LOW (ref 3.5–5.3)
POTASSIUM SERPL-SCNC: 3.1 MMOL/L — LOW (ref 3.5–5.3)
RBC # BLD: 4.11 M/UL — LOW (ref 4.4–5.2)
RBC # FLD: 15 % — SIGNIFICANT CHANGE UP (ref 11–15.6)
SODIUM SERPL-SCNC: 135 MMOL/L — SIGNIFICANT CHANGE UP (ref 135–145)
SODIUM SERPL-SCNC: 135 MMOL/L — SIGNIFICANT CHANGE UP (ref 135–145)
WBC # BLD: 13.9 K/UL — HIGH (ref 4.8–10.8)
WBC # FLD AUTO: 13.9 K/UL — HIGH (ref 4.8–10.8)

## 2019-01-28 PROCEDURE — 99232 SBSQ HOSP IP/OBS MODERATE 35: CPT

## 2019-01-28 RX ORDER — MAGNESIUM SULFATE 500 MG/ML
1 VIAL (ML) INJECTION ONCE
Qty: 0 | Refills: 0 | Status: COMPLETED | OUTPATIENT
Start: 2019-01-28 | End: 2019-01-28

## 2019-01-28 RX ORDER — POTASSIUM CHLORIDE 20 MEQ
40 PACKET (EA) ORAL
Qty: 0 | Refills: 0 | Status: COMPLETED | OUTPATIENT
Start: 2019-01-28 | End: 2019-01-29

## 2019-01-28 RX ORDER — POTASSIUM CHLORIDE 20 MEQ
10 PACKET (EA) ORAL ONCE
Qty: 0 | Refills: 0 | Status: COMPLETED | OUTPATIENT
Start: 2019-01-28 | End: 2019-01-28

## 2019-01-28 RX ADMIN — Medication 100 MILLIEQUIVALENT(S): at 10:15

## 2019-01-28 RX ADMIN — Medication 40 MILLIGRAM(S): at 05:39

## 2019-01-28 RX ADMIN — Medication 100 GRAM(S): at 15:00

## 2019-01-28 RX ADMIN — HEPARIN SODIUM 5000 UNIT(S): 5000 INJECTION INTRAVENOUS; SUBCUTANEOUS at 05:39

## 2019-01-28 RX ADMIN — Medication 3 MILLILITER(S): at 21:52

## 2019-01-28 RX ADMIN — Medication 40 MILLIGRAM(S): at 05:38

## 2019-01-28 RX ADMIN — LEVALBUTEROL 0.63 MILLIGRAM(S): 1.25 SOLUTION, CONCENTRATE RESPIRATORY (INHALATION) at 21:53

## 2019-01-28 RX ADMIN — HEPARIN SODIUM 5000 UNIT(S): 5000 INJECTION INTRAVENOUS; SUBCUTANEOUS at 17:20

## 2019-01-28 RX ADMIN — Medication 1200 MILLIGRAM(S): at 05:39

## 2019-01-28 RX ADMIN — Medication 50 MILLIGRAM(S): at 17:20

## 2019-01-28 RX ADMIN — Medication 75 MILLIGRAM(S): at 17:20

## 2019-01-28 RX ADMIN — AMLODIPINE BESYLATE 10 MILLIGRAM(S): 2.5 TABLET ORAL at 05:39

## 2019-01-28 RX ADMIN — LEVALBUTEROL 0.63 MILLIGRAM(S): 1.25 SOLUTION, CONCENTRATE RESPIRATORY (INHALATION) at 16:19

## 2019-01-28 RX ADMIN — Medication 1 TABLET(S): at 17:20

## 2019-01-28 RX ADMIN — Medication 3 MILLILITER(S): at 09:47

## 2019-01-28 RX ADMIN — Medication 3 MILLILITER(S): at 03:12

## 2019-01-28 RX ADMIN — Medication 325 MILLIGRAM(S): at 11:39

## 2019-01-28 RX ADMIN — Medication 1 TABLET(S): at 12:27

## 2019-01-28 RX ADMIN — Medication 40 MILLIEQUIVALENT(S): at 17:19

## 2019-01-28 RX ADMIN — LEVALBUTEROL 0.63 MILLIGRAM(S): 1.25 SOLUTION, CONCENTRATE RESPIRATORY (INHALATION) at 03:11

## 2019-01-28 RX ADMIN — PANTOPRAZOLE SODIUM 40 MILLIGRAM(S): 20 TABLET, DELAYED RELEASE ORAL at 05:40

## 2019-01-28 RX ADMIN — Medication 1200 MILLIGRAM(S): at 17:20

## 2019-01-28 RX ADMIN — ATORVASTATIN CALCIUM 10 MILLIGRAM(S): 80 TABLET, FILM COATED ORAL at 21:48

## 2019-01-28 RX ADMIN — PIPERACILLIN AND TAZOBACTAM 25 GRAM(S): 4; .5 INJECTION, POWDER, LYOPHILIZED, FOR SOLUTION INTRAVENOUS at 05:39

## 2019-01-28 RX ADMIN — Medication 75 MILLIGRAM(S): at 05:39

## 2019-01-28 RX ADMIN — Medication 1 TABLET(S): at 09:01

## 2019-01-28 RX ADMIN — Medication 3 MILLILITER(S): at 16:20

## 2019-01-28 RX ADMIN — LEVALBUTEROL 0.63 MILLIGRAM(S): 1.25 SOLUTION, CONCENTRATE RESPIRATORY (INHALATION) at 09:47

## 2019-01-28 RX ADMIN — AZITHROMYCIN 255 MILLIGRAM(S): 500 TABLET, FILM COATED ORAL at 01:11

## 2019-01-28 RX ADMIN — Medication 50 MILLIGRAM(S): at 05:39

## 2019-01-28 NOTE — PROGRESS NOTE ADULT - ATTENDING COMMENTS
Pt is seen and examined, report feeling better, improving cough and SOB  On exam - expiratory wheezing noted   Flu/bronchospasm- c/w tamiflu, prednisone, nebs, oxygen   try to taper off oxygen  encourage ambulation Pt is seen and examined, report feeling better, improving cough and SOB  On exam - expiratory wheezing noted   Flu/bronchospasm- c/w tamiflu, prednisone, nebs, oxygen   try to taper off oxygen  encourage ambulation  K 2.8 - PO and IV supplemented, f/u BMP  ICU Vital Signs Last 24 Hrs  T(C): 36.6 (28 Jan 2019 11:52), Max: 36.9 (27 Jan 2019 15:56)  T(F): 97.9 (28 Jan 2019 11:52), Max: 98.4 (27 Jan 2019 15:56)  HR: 73 (28 Jan 2019 11:52) (72 - 78)  BP: 133/72 (28 Jan 2019 11:52) (133/72 - 145/70)  BP(mean): --  ABP: --  ABP(mean): --  RR: 18 (28 Jan 2019 11:52) (18 - 18)  SpO2: 93% (28 Jan 2019 09:50) (93% - 98%)

## 2019-01-28 NOTE — PROGRESS NOTE ADULT - ASSESSMENT
This is 88 y/o female was brought in for  flu like symptoms, RVP positive for Flu, started on Tamiflu, CT chest showed Right lower lobe distal airway mucoid impaction of right will  lobe subsegmental atelectasis.    A/P    >Influenza A- c/w Tamiflu  isolation  CXR did not show obvious pneumonia, CT chest showed mucus plugging  Mucomyst inhalation added with xopenex  taper steroid    > Hypokalemia  2.8  this am       Supplement 40meq K  PO x 2 and   10meqIVPB        Repeat lab at 1300 for serum K    >Bacteremia -1 out 2 blood c/s grew bacillus species not anthracis - discussed with Dr. Benites, likely contaminant, f/u second blood c/s  pt is non toxic     > Essential HTN, continue home meds.     > hx of CAD ( non-obstructive CAD on cardiac cath 2014) , diastolic heart failure, h/o MDT  PAF   not on A/C at home- reason unknown  c/w home medication    > DVT PPX - Heparin This is 88 y/o female was brought in for  flu like symptoms, RVP positive for Flu, started on Tamiflu, CT chest showed Right lower lobe distal airway mucoid impaction of right will  lobe subsegmental atelectasis.    A/P    >Influenza A- c/w Tamiflu  isolation  CXR did not show obvious pneumonia, CT chest showed mucus plugging  Mucomyst inhalation added with xopenex  taper steroid    > Hypokalemia  2.8  this am       Supplement 40meq K  PO x 2 and   10meqIVPB        Repeat lab at 1300 for serum K    >Bacteremia -1 out 2 blood c/s grew bacillus species not anthracis - discussed with Dr. Benites, likely contaminant,  second blood c/s negative  d/c'd zosyn  pt is non toxic     > Essential HTN, continue home meds.     > hx of CAD ( non-obstructive CAD on cardiac cath 2014) , diastolic heart failure, h/o MDT  PAF   not on A/C at home- reason unknown  c/w home medication    > DVT PPX - Heparin

## 2019-01-28 NOTE — PROGRESS NOTE ADULT - SUBJECTIVE AND OBJECTIVE BOX
Internal Medicine Hospitalist - Dr. Luther HEREDIA    [id number]    89y      Female    Patient is a 89y old  Female who presents with a chief complaint of cough (2019 16:49)    INTERVAL HPI/ OVERNIGHT EVENTS: Patient is seen and examined, report feeling better, cough and SOB improving but retains expiratory wheeze, afebrile    REVIEW OF SYSTEMS:    Denied fever, chills, abd. pain, nausea, vomiting, chest pain,  headache, dizziness    PHYSICAL EXAM:    ICU Vital Signs Last 24 Hrs  T(C): 36.6 (2019 11:52), Max: 36.9 (2019 15:56)  T(F): 97.9 (2019 11:52), Max: 98.4 (2019 15:56)  HR: 73 (2019 11:52) (72 - 78)  BP: 133/72 (2019 11:52) (133/72 - 145/70)  BP(mean): --  ABP: --  ABP(mean): --  RR: 18 (2019 11:52) (18 - 18)  SpO2: 93% (2019 09:50) (93% - 98%)    GENERAL: NAD  CHEST/LUNG: improving air entry  HEART: S1S2+ audible  ABDOMEN: Soft, Nontender, Nondistended; Bowel sounds present  EXTREMITIES:  no edema  CNS: AAO X 3  Psychiatry: normal mood    LABS:                                11.4   13.9  )-----------( 300      ( 2019 07:16 )             35.4         135  |  90<L>  |  17.0  ----------------------------<  160<H>  2.8<LL>   |  29.0  |  0.59    Ca    8.6      2019 07:16  Phos  4.0       Mg     1.9           Urinalysis Basic - ( 2019 03:13 )    Color: Yellow / Appearance: Clear / S.010 / pH: x  Gluc: x / Ketone: Negative  / Bili: Negative / Urobili: Negative mg/dL   Blood: x / Protein: 15 mg/dL / Nitrite: Negative   Leuk Esterase: Negative / RBC: x / WBC x   Sq Epi: x / Non Sq Epi: Occasional / Bacteria: x          MEDICATIONS  (STANDING):  acetylcysteine 10%  Inhalation 3 milliLiter(s) Inhalation every 6 hours  ALBUTerol    90 MICROgram(s) HFA Inhaler 1 Puff(s) Inhalation every 4 hours  amLODIPine   Tablet 10 milliGRAM(s) Oral daily  aspirin enteric coated 325 milliGRAM(s) Oral daily  atorvastatin 10 milliGRAM(s) Oral at bedtime  azithromycin  IVPB 500 milliGRAM(s) IV Intermittent every 24 hours  furosemide    Tablet 40 milliGRAM(s) Oral daily  guaiFENesin ER 1200 milliGRAM(s) Oral every 12 hours  heparin  Injectable 5000 Unit(s) SubCutaneous every 12 hours  hydrALAZINE 50 milliGRAM(s) Oral two times a day  lactobacillus acidophilus 1 Tablet(s) Oral three times a day with meals  levalbuterol Inhalation 0.63 milliGRAM(s) Inhalation every 6 hours  methylPREDNISolone sodium succinate Injectable 40 milliGRAM(s) IV Push every 8 hours  oseltamivir 75 milliGRAM(s) Oral two times a day  pantoprazole    Tablet 40 milliGRAM(s) Oral before breakfast  piperacillin/tazobactam IVPB. 3.375 Gram(s) IV Intermittent every 8 hours  tiotropium 18 MICROgram(s) Capsule 1 Capsule(s) Inhalation daily    MEDICATIONS  (PRN):  acetaminophen   Tablet .. 650 milliGRAM(s) Oral every 6 hours PRN Temp greater or equal to 38C (100.4F), Mild Pain (1 - 3), Moderate Pain (4 - 6)      RADIOLOGY & ADDITIONAL TEST    < from: CT Chest No Cont (19 @ 22:50) >    IMPRESSION: Right lower lobe distal airway mucoid impaction of right will   lobe subsegmental atelectasis.    < end of copied text > Internal Medicine Hospitalist - Dr. Luther HEREDIA    [id number]    89y      Female    Patient is a 89y old  Female who presents with a chief complaint of cough (2019 16:49)    INTERVAL HPI/ OVERNIGHT EVENTS: Patient is seen and examined, report feeling better, cough and SOB improving but retains expiratory wheeze, afebrile    REVIEW OF SYSTEMS:    Denied fever, chills, abd. pain, nausea, vomiting, chest pain,  headache, dizziness    PHYSICAL EXAM:    ICU Vital Signs Last 24 Hrs  T(C): 36.6 (2019 11:52), Max: 36.9 (2019 15:56)  T(F): 97.9 (2019 11:52), Max: 98.4 (2019 15:56)  HR: 73 (2019 11:52) (72 - 78)  BP: 133/72 (2019 11:52) (133/72 - 145/70)  BP(mean): --  ABP: --  ABP(mean): --  RR: 18 (2019 11:52) (18 - 18)  SpO2: 93% (2019 09:50) (93% - 98%)    GENERAL: NAD  CHEST/LUNG: positive few expiratory wheezing noted  HEART: S1S2+ audible  ABDOMEN: Soft, Nontender, Nondistended; Bowel sounds present  EXTREMITIES:  no edema  CNS: AAO X 3  Psychiatry: normal mood    LABS:                                11.4   13.9  )-----------( 300      ( 2019 07:16 )             35.4         135  |  90<L>  |  17.0  ----------------------------<  160<H>  2.8<LL>   |  29.0  |  0.59    Ca    8.6      2019 07:16  Phos  4.0       Mg     1.9           Urinalysis Basic - ( 2019 03:13 )    Color: Yellow / Appearance: Clear / S.010 / pH: x  Gluc: x / Ketone: Negative  / Bili: Negative / Urobili: Negative mg/dL   Blood: x / Protein: 15 mg/dL / Nitrite: Negative   Leuk Esterase: Negative / RBC: x / WBC x   Sq Epi: x / Non Sq Epi: Occasional / Bacteria: x          MEDICATIONS  (STANDING):  acetylcysteine 10%  Inhalation 3 milliLiter(s) Inhalation every 6 hours  ALBUTerol    90 MICROgram(s) HFA Inhaler 1 Puff(s) Inhalation every 4 hours  amLODIPine   Tablet 10 milliGRAM(s) Oral daily  aspirin enteric coated 325 milliGRAM(s) Oral daily  atorvastatin 10 milliGRAM(s) Oral at bedtime  azithromycin  IVPB 500 milliGRAM(s) IV Intermittent every 24 hours  furosemide    Tablet 40 milliGRAM(s) Oral daily  guaiFENesin ER 1200 milliGRAM(s) Oral every 12 hours  heparin  Injectable 5000 Unit(s) SubCutaneous every 12 hours  hydrALAZINE 50 milliGRAM(s) Oral two times a day  lactobacillus acidophilus 1 Tablet(s) Oral three times a day with meals  levalbuterol Inhalation 0.63 milliGRAM(s) Inhalation every 6 hours  methylPREDNISolone sodium succinate Injectable 40 milliGRAM(s) IV Push every 8 hours  oseltamivir 75 milliGRAM(s) Oral two times a day  pantoprazole    Tablet 40 milliGRAM(s) Oral before breakfast  piperacillin/tazobactam IVPB. 3.375 Gram(s) IV Intermittent every 8 hours  tiotropium 18 MICROgram(s) Capsule 1 Capsule(s) Inhalation daily    MEDICATIONS  (PRN):  acetaminophen   Tablet .. 650 milliGRAM(s) Oral every 6 hours PRN Temp greater or equal to 38C (100.4F), Mild Pain (1 - 3), Moderate Pain (4 - 6)      RADIOLOGY & ADDITIONAL TEST    < from: CT Chest No Cont (19 @ 22:50) >    IMPRESSION: Right lower lobe distal airway mucoid impaction of right will   lobe subsegmental atelectasis.    < end of copied text >

## 2019-01-29 LAB
ANION GAP SERPL CALC-SCNC: 13 MMOL/L — SIGNIFICANT CHANGE UP (ref 5–17)
BUN SERPL-MCNC: 12 MG/DL — SIGNIFICANT CHANGE UP (ref 8–20)
CALCIUM SERPL-MCNC: 8.7 MG/DL — SIGNIFICANT CHANGE UP (ref 8.6–10.2)
CHLORIDE SERPL-SCNC: 95 MMOL/L — LOW (ref 98–107)
CO2 SERPL-SCNC: 28 MMOL/L — SIGNIFICANT CHANGE UP (ref 22–29)
CREAT SERPL-MCNC: 0.48 MG/DL — LOW (ref 0.5–1.3)
GLUCOSE SERPL-MCNC: 122 MG/DL — HIGH (ref 70–115)
HCT VFR BLD CALC: 37.2 % — SIGNIFICANT CHANGE UP (ref 37–47)
HGB BLD-MCNC: 12 G/DL — SIGNIFICANT CHANGE UP (ref 12–16)
MAGNESIUM SERPL-MCNC: 2.3 MG/DL — SIGNIFICANT CHANGE UP (ref 1.6–2.6)
MCHC RBC-ENTMCNC: 27.6 PG — SIGNIFICANT CHANGE UP (ref 27–31)
MCHC RBC-ENTMCNC: 32.3 G/DL — SIGNIFICANT CHANGE UP (ref 32–36)
MCV RBC AUTO: 85.5 FL — SIGNIFICANT CHANGE UP (ref 81–99)
PLATELET # BLD AUTO: 315 K/UL — SIGNIFICANT CHANGE UP (ref 150–400)
POTASSIUM SERPL-MCNC: 4.3 MMOL/L — SIGNIFICANT CHANGE UP (ref 3.5–5.3)
POTASSIUM SERPL-SCNC: 4.3 MMOL/L — SIGNIFICANT CHANGE UP (ref 3.5–5.3)
RBC # BLD: 4.35 M/UL — LOW (ref 4.4–5.2)
RBC # FLD: 15.2 % — SIGNIFICANT CHANGE UP (ref 11–15.6)
SODIUM SERPL-SCNC: 136 MMOL/L — SIGNIFICANT CHANGE UP (ref 135–145)
WBC # BLD: 10.5 K/UL — SIGNIFICANT CHANGE UP (ref 4.8–10.8)
WBC # FLD AUTO: 10.5 K/UL — SIGNIFICANT CHANGE UP (ref 4.8–10.8)

## 2019-01-29 PROCEDURE — 99232 SBSQ HOSP IP/OBS MODERATE 35: CPT

## 2019-01-29 RX ORDER — IPRATROPIUM/ALBUTEROL SULFATE 18-103MCG
3 AEROSOL WITH ADAPTER (GRAM) INHALATION EVERY 6 HOURS
Qty: 0 | Refills: 0 | Status: DISCONTINUED | OUTPATIENT
Start: 2019-01-29 | End: 2019-01-30

## 2019-01-29 RX ADMIN — Medication 40 MILLIGRAM(S): at 16:03

## 2019-01-29 RX ADMIN — Medication 50 MILLIGRAM(S): at 05:03

## 2019-01-29 RX ADMIN — AMLODIPINE BESYLATE 10 MILLIGRAM(S): 2.5 TABLET ORAL at 05:03

## 2019-01-29 RX ADMIN — PANTOPRAZOLE SODIUM 40 MILLIGRAM(S): 20 TABLET, DELAYED RELEASE ORAL at 05:03

## 2019-01-29 RX ADMIN — Medication 50 MILLIGRAM(S): at 17:45

## 2019-01-29 RX ADMIN — Medication 3 MILLILITER(S): at 03:47

## 2019-01-29 RX ADMIN — Medication 1 TABLET(S): at 09:17

## 2019-01-29 RX ADMIN — AZITHROMYCIN 255 MILLIGRAM(S): 500 TABLET, FILM COATED ORAL at 02:07

## 2019-01-29 RX ADMIN — Medication 1200 MILLIGRAM(S): at 17:45

## 2019-01-29 RX ADMIN — Medication 325 MILLIGRAM(S): at 11:35

## 2019-01-29 RX ADMIN — HEPARIN SODIUM 5000 UNIT(S): 5000 INJECTION INTRAVENOUS; SUBCUTANEOUS at 05:02

## 2019-01-29 RX ADMIN — HEPARIN SODIUM 5000 UNIT(S): 5000 INJECTION INTRAVENOUS; SUBCUTANEOUS at 17:45

## 2019-01-29 RX ADMIN — Medication 75 MILLIGRAM(S): at 05:03

## 2019-01-29 RX ADMIN — Medication 1 TABLET(S): at 12:05

## 2019-01-29 RX ADMIN — LEVALBUTEROL 0.63 MILLIGRAM(S): 1.25 SOLUTION, CONCENTRATE RESPIRATORY (INHALATION) at 03:47

## 2019-01-29 RX ADMIN — Medication 40 MILLIEQUIVALENT(S): at 05:06

## 2019-01-29 RX ADMIN — Medication 1200 MILLIGRAM(S): at 05:03

## 2019-01-29 RX ADMIN — Medication 75 MILLIGRAM(S): at 17:45

## 2019-01-29 RX ADMIN — ATORVASTATIN CALCIUM 10 MILLIGRAM(S): 80 TABLET, FILM COATED ORAL at 21:36

## 2019-01-29 RX ADMIN — Medication 3 MILLILITER(S): at 20:54

## 2019-01-29 RX ADMIN — Medication 1 TABLET(S): at 17:45

## 2019-01-29 RX ADMIN — Medication 40 MILLIGRAM(S): at 05:03

## 2019-01-29 RX ADMIN — Medication 3 MILLILITER(S): at 15:26

## 2019-01-29 RX ADMIN — Medication 40 MILLIGRAM(S): at 21:36

## 2019-01-29 RX ADMIN — LEVALBUTEROL 0.63 MILLIGRAM(S): 1.25 SOLUTION, CONCENTRATE RESPIRATORY (INHALATION) at 09:13

## 2019-01-29 NOTE — PROGRESS NOTE ADULT - SUBJECTIVE AND OBJECTIVE BOX
Internal Medicine Hospitalist - Dr. Luther HEREDIA    [id number]    89y      Female    Patient is a 89y old  Female who presents with a chief complaint of cough (28 Jan 2019 13:25)    INTERVAL HPI/ OVERNIGHT EVENTS: Patient is seen and examined, report cough and mild SOB, afebrile    REVIEW OF SYSTEMS:    Denied fever, chills, abd. pain, nausea, vomiting, chest pain,headache, dizziness    PHYSICAL EXAM:    Vital Signs Last 24 Hrs  T(C): 36.8 (29 Jan 2019 10:38), Max: 36.9 (28 Jan 2019 16:10)  T(F): 98.3 (29 Jan 2019 10:38), Max: 98.4 (28 Jan 2019 16:10)  HR: 76 (29 Jan 2019 10:38) (70 - 86)  BP: 134/76 (29 Jan 2019 10:38) (134/76 - 150/72)  BP(mean): --  RR: 20 (29 Jan 2019 10:38) (18 - 20)  SpO2: 95% (29 Jan 2019 09:15) (93% - 95%)    GENERAL: NAD  CHEST/LUNG: positive expiratory wheezing noted  HEART: S1S2+ audible  ABDOMEN: Soft, Nontender, Nondistended; Bowel sounds present  EXTREMITIES:  no edema  CNS: AAO X 3  Psychiatry: normal mood    LABS:                        12.0   10.5  )-----------( 315      ( 29 Jan 2019 08:00 )             37.2     01-29    136  |  95<L>  |  12.0  ----------------------------<  122<H>  4.3   |  28.0  |  0.48<L>    Ca    8.7      29 Jan 2019 07:42  Phos  4.0     01-28  Mg     2.3     01-29         MEDICATIONS  (STANDING):  ALBUTerol    90 MICROgram(s) HFA Inhaler 1 Puff(s) Inhalation every 4 hours  ALBUTerol/ipratropium for Nebulization 3 milliLiter(s) Nebulizer every 6 hours  amLODIPine   Tablet 10 milliGRAM(s) Oral daily  aspirin enteric coated 325 milliGRAM(s) Oral daily  atorvastatin 10 milliGRAM(s) Oral at bedtime  azithromycin  IVPB 500 milliGRAM(s) IV Intermittent every 24 hours  furosemide    Tablet 40 milliGRAM(s) Oral daily  guaiFENesin ER 1200 milliGRAM(s) Oral every 12 hours  heparin  Injectable 5000 Unit(s) SubCutaneous every 12 hours  hydrALAZINE 50 milliGRAM(s) Oral two times a day  lactobacillus acidophilus 1 Tablet(s) Oral three times a day with meals  methylPREDNISolone sodium succinate Injectable 40 milliGRAM(s) IV Push three times a day  oseltamivir 75 milliGRAM(s) Oral two times a day  pantoprazole    Tablet 40 milliGRAM(s) Oral before breakfast  tiotropium 18 MICROgram(s) Capsule 1 Capsule(s) Inhalation daily    MEDICATIONS  (PRN):  acetaminophen   Tablet .. 650 milliGRAM(s) Oral every 6 hours PRN Temp greater or equal to 38C (100.4F), Mild Pain (1 - 3), Moderate Pain (4 - 6)      RADIOLOGY & ADDITIONAL TEST

## 2019-01-29 NOTE — PROGRESS NOTE ADULT - ASSESSMENT
This is 88 y/o female was brought in for  flu like symptoms, RVP positive for Flu, started on Tamiflu, CT chest showed Right lower lobe distal airway mucoid impaction of right will  lobe subsegmental atelectasis.    A/P    >Influenza A with bronchospasm  c/w Tamiflu  isolation  CXR did not show obvious pneumonia, CT chest showed mucus plugging  Mucomyst inhalation added with xopenex  still wheezing noted - switch Prednisone to IV solumedrol for 1 more day  c/w Duoneb q6h standing    > Hypokalemia  resolved    >Bacteremia -1 out 2 blood c/s grew bacillus species not anthracis - discussed with Dr. Benites, likely contaminant,  second blood c/s negative  d/c'd zosyn  pt is non toxic     > Essential HTN, continue home meds.     > hx of CAD ( non-obstructive CAD on cardiac cath 2014) , diastolic heart failure, h/o MDT  PAF   not on A/C at home- reason unknown  c/w home medication    > DVT PPX - Heparin

## 2019-01-30 ENCOUNTER — TRANSCRIPTION ENCOUNTER (OUTPATIENT)
Age: 84
End: 2019-01-30

## 2019-01-30 VITALS
SYSTOLIC BLOOD PRESSURE: 136 MMHG | HEART RATE: 90 BPM | RESPIRATION RATE: 18 BRPM | TEMPERATURE: 98 F | OXYGEN SATURATION: 93 % | DIASTOLIC BLOOD PRESSURE: 78 MMHG

## 2019-01-30 LAB
CULTURE RESULTS: SIGNIFICANT CHANGE UP
SPECIMEN SOURCE: SIGNIFICANT CHANGE UP

## 2019-01-30 PROCEDURE — 80053 COMPREHEN METABOLIC PANEL: CPT

## 2019-01-30 PROCEDURE — 85610 PROTHROMBIN TIME: CPT

## 2019-01-30 PROCEDURE — T1013: CPT

## 2019-01-30 PROCEDURE — 71046 X-RAY EXAM CHEST 2 VIEWS: CPT

## 2019-01-30 PROCEDURE — 87798 DETECT AGENT NOS DNA AMP: CPT

## 2019-01-30 PROCEDURE — 87581 M.PNEUMON DNA AMP PROBE: CPT

## 2019-01-30 PROCEDURE — 87040 BLOOD CULTURE FOR BACTERIA: CPT

## 2019-01-30 PROCEDURE — 87486 CHLMYD PNEUM DNA AMP PROBE: CPT

## 2019-01-30 PROCEDURE — 71250 CT THORAX DX C-: CPT

## 2019-01-30 PROCEDURE — 83605 ASSAY OF LACTIC ACID: CPT

## 2019-01-30 PROCEDURE — 83735 ASSAY OF MAGNESIUM: CPT

## 2019-01-30 PROCEDURE — 84100 ASSAY OF PHOSPHORUS: CPT

## 2019-01-30 PROCEDURE — 81001 URINALYSIS AUTO W/SCOPE: CPT

## 2019-01-30 PROCEDURE — 36415 COLL VENOUS BLD VENIPUNCTURE: CPT

## 2019-01-30 PROCEDURE — 87633 RESP VIRUS 12-25 TARGETS: CPT

## 2019-01-30 PROCEDURE — 99239 HOSP IP/OBS DSCHRG MGMT >30: CPT

## 2019-01-30 PROCEDURE — 96367 TX/PROPH/DG ADDL SEQ IV INF: CPT

## 2019-01-30 PROCEDURE — 96375 TX/PRO/DX INJ NEW DRUG ADDON: CPT

## 2019-01-30 PROCEDURE — 71045 X-RAY EXAM CHEST 1 VIEW: CPT

## 2019-01-30 PROCEDURE — 87086 URINE CULTURE/COLONY COUNT: CPT

## 2019-01-30 PROCEDURE — 94760 N-INVAS EAR/PLS OXIMETRY 1: CPT

## 2019-01-30 PROCEDURE — 94640 AIRWAY INHALATION TREATMENT: CPT

## 2019-01-30 PROCEDURE — 93005 ELECTROCARDIOGRAM TRACING: CPT

## 2019-01-30 PROCEDURE — 96365 THER/PROPH/DIAG IV INF INIT: CPT

## 2019-01-30 PROCEDURE — 80048 BASIC METABOLIC PNL TOTAL CA: CPT

## 2019-01-30 PROCEDURE — 85730 THROMBOPLASTIN TIME PARTIAL: CPT

## 2019-01-30 PROCEDURE — 85027 COMPLETE CBC AUTOMATED: CPT

## 2019-01-30 PROCEDURE — 99285 EMERGENCY DEPT VISIT HI MDM: CPT | Mod: 25

## 2019-01-30 PROCEDURE — 96361 HYDRATE IV INFUSION ADD-ON: CPT

## 2019-01-30 RX ORDER — HYDRALAZINE HCL 50 MG
50 TABLET ORAL
Qty: 0 | Refills: 0 | COMMUNITY

## 2019-01-30 RX ORDER — ALBUTEROL 90 UG/1
1 AEROSOL, METERED ORAL
Qty: 1 | Refills: 0 | OUTPATIENT
Start: 2019-01-30

## 2019-01-30 RX ORDER — AMLODIPINE BESYLATE 2.5 MG/1
1 TABLET ORAL
Qty: 0 | Refills: 0 | COMMUNITY

## 2019-01-30 RX ORDER — ASPIRIN/CALCIUM CARB/MAGNESIUM 324 MG
1 TABLET ORAL
Qty: 0 | Refills: 0 | COMMUNITY
Start: 2019-01-30

## 2019-01-30 RX ADMIN — PANTOPRAZOLE SODIUM 40 MILLIGRAM(S): 20 TABLET, DELAYED RELEASE ORAL at 05:54

## 2019-01-30 RX ADMIN — Medication 75 MILLIGRAM(S): at 14:10

## 2019-01-30 RX ADMIN — Medication 40 MILLIGRAM(S): at 05:54

## 2019-01-30 RX ADMIN — Medication 325 MILLIGRAM(S): at 11:36

## 2019-01-30 RX ADMIN — Medication 3 MILLILITER(S): at 04:13

## 2019-01-30 RX ADMIN — Medication 40 MILLIGRAM(S): at 05:53

## 2019-01-30 RX ADMIN — Medication 50 MILLIGRAM(S): at 05:53

## 2019-01-30 RX ADMIN — Medication 3 MILLILITER(S): at 09:39

## 2019-01-30 RX ADMIN — AMLODIPINE BESYLATE 10 MILLIGRAM(S): 2.5 TABLET ORAL at 05:54

## 2019-01-30 RX ADMIN — Medication 1 TABLET(S): at 11:35

## 2019-01-30 RX ADMIN — Medication 1 TABLET(S): at 08:59

## 2019-01-30 RX ADMIN — Medication 40 MILLIGRAM(S): at 14:10

## 2019-01-30 RX ADMIN — HEPARIN SODIUM 5000 UNIT(S): 5000 INJECTION INTRAVENOUS; SUBCUTANEOUS at 05:53

## 2019-01-30 RX ADMIN — AZITHROMYCIN 255 MILLIGRAM(S): 500 TABLET, FILM COATED ORAL at 01:05

## 2019-01-30 RX ADMIN — Medication 1200 MILLIGRAM(S): at 05:54

## 2019-01-30 RX ADMIN — Medication 75 MILLIGRAM(S): at 05:54

## 2019-01-30 NOTE — DISCHARGE NOTE ADULT - MEDICATION SUMMARY - MEDICATIONS TO TAKE
I will START or STAY ON the medications listed below when I get home from the hospital:    predniSONE 10 mg oral tablet  -- taper as follows;40mg x 2 days, 20mg x 2 days, 10mg x 2 days.  -- It is very important that you take or use this exactly as directed.  Do not skip doses or discontinue unless directed by your doctor.  Obtain medical advice before taking any non-prescription drugs as some may affect the action of this medication.  Take with food or milk.    -- Indication: For sob    aspirin 325 mg oral delayed release tablet  -- 1 tab(s) by mouth once a day  -- Indication: For cad    atorvastatin 10 mg oral tablet  -- 1 tab(s) by mouth once a day (at bedtime)  -- Indication: For cad    Ventolin HFA 90 mcg/inh inhalation aerosol  -- 1 puff(s) inhaled every 6 hours   -- For inhalation only.  It is very important that you take or use this exactly as directed.  Do not skip doses or discontinue unless directed by your doctor.  Obtain medical advice before taking any non-prescription drugs as some may affect the action of this medication.  Shake well before use.    -- Indication: For sob    amLODIPine 10 mg oral tablet  -- 1 tab(s) by mouth once a day  -- Indication: For htn    Lasix 40 mg oral tablet  -- 1 tab(s) by mouth once a day (at bedtime)   -- Avoid prolonged or excessive exposure to direct and/or artificial sunlight while taking this medication.  It is very important that you take or use this exactly as directed.  Do not skip doses or discontinue unless directed by your doctor.  It may be advisable to drink a full glass orange juice or eat a banana daily while taking this medication.    -- Indication: For home medication    guaiFENesin 1200 mg oral tablet, extended release  -- 1 tab(s) by mouth every 12 hours  -- Indication: For cough    omeprazole 20 mg oral delayed release tablet  -- 1 tab(s) by mouth once a day   -- Obtain medical advice before taking any non-prescription drugs as some may affect the action of this medication.  Swallow whole.  Do not crush.    -- Indication: For home medication    hydrALAZINE 50 mg oral tablet  -- 50 milligram(s) by mouth 2 times a day  -- Indication: For htn    multivitamin  -- Indication: For supplement    Calcium 600+D oral tablet  -- 1 tab(s) by mouth 2 times a day  -- Indication: For supplement

## 2019-01-30 NOTE — DISCHARGE NOTE ADULT - HOSPITAL COURSE
This is 89 y/o female was brought in for  flu like symptoms, RVP positive for Flu, started on Tamiflu, CT chest showed Right lower lobe distal airway mucoid impaction of right will  lobe subsegmental atelectasis. Pt was also noted to have bronchospasm, treated with Solumedrol, feeling better, will discharge home on Po prednisone. Pt report feeling better, denied chest pain, SOB, abd. pain, nausea and vomiting.     A/P    >Influenza A with bronchospasm  completed Tamiflu  CXR did not show obvious pneumonia, CT chest showed mucus plugging  c/w prednisone taper and Ventolin prn    > Hypokalemia  resolved    >Bacteremia -1 out 2 blood c/s grew bacillus species not anthracis - discussed with Dr. Benites, likely contaminant,  second blood c/s negative    > Essential HTN, continue home meds.     > hx of CAD ( non-obstructive CAD on cardiac cath 2014) , diastolic heart failure, h/o MDT  PAF   not on A/C at home- reason unknown  c/w home medication    ICU Vital Signs Last 24 Hrs  T(C): 36.5 (30 Jan 2019 05:03), Max: 37.1 (29 Jan 2019 20:05)  T(F): 97.7 (30 Jan 2019 05:03), Max: 98.7 (29 Jan 2019 20:05)  HR: 80 (30 Jan 2019 09:39) (71 - 90)  BP: 139/69 (30 Jan 2019 05:03) (134/76 - 149/75)  RR: 18 (30 Jan 2019 05:03) (18 - 20)  SpO2: 96% (30 Jan 2019 09:39) (89% - 99%)    PHYSICAL EXAM:    GENERAL: NAD  CHEST/LUNG: air entry improved  HEART: s1/s2 audible  ABDOMEN: Soft, Nontender, Nondistended; Bowel sounds present  EXTREMITIES:  no edema     time spent 36 minutes

## 2019-01-30 NOTE — DISCHARGE NOTE ADULT - SECONDARY DIAGNOSIS.
CAD (coronary artery disease), native coronary artery Hypertension Hypokalemia Chronic diastolic heart failure

## 2019-01-30 NOTE — DISCHARGE NOTE ADULT - PATIENT PORTAL LINK FT
You can access the [facility] Patient Portal, offered by [facility], by registering with the following website: [email or web address]

## 2019-01-30 NOTE — DISCHARGE NOTE ADULT - CARE PLAN
Principal Discharge DX:	Influenza  Goal:	completed course of Tamiflu  Assessment and plan of treatment:	c/w Prednisone taper, Ventolin prn  Secondary Diagnosis:	CAD (coronary artery disease), native coronary artery  Assessment and plan of treatment:	c/w home medication  Secondary Diagnosis:	Hypertension  Assessment and plan of treatment:	c/w home medication  Secondary Diagnosis:	Hypokalemia  Assessment and plan of treatment:	resolved  Secondary Diagnosis:	Chronic diastolic heart failure  Assessment and plan of treatment:	c/w home medication

## 2019-01-31 LAB
CULTURE RESULTS: SIGNIFICANT CHANGE UP
SPECIMEN SOURCE: SIGNIFICANT CHANGE UP

## 2019-03-05 ENCOUNTER — RECORD ABSTRACTING (OUTPATIENT)
Age: 84
End: 2019-03-05

## 2019-03-05 RX ORDER — OMEPRAZOLE 20 MG/1
20 CAPSULE, DELAYED RELEASE ORAL
Refills: 0 | Status: ACTIVE | COMMUNITY
Start: 2018-03-03

## 2019-03-05 RX ORDER — ALUMINUM HYDROXIDE, MAGNESIUM HYDROXIDE, SIMETHICONE 400; 400; 40 MG/10ML; MG/10ML; MG/10ML
200-200-20 SUSPENSION ORAL
Refills: 0 | Status: ACTIVE | COMMUNITY
Start: 2018-06-25

## 2019-03-05 RX ORDER — HYDRALAZINE HYDROCHLORIDE 50 MG/1
50 TABLET ORAL
Refills: 0 | Status: ACTIVE | COMMUNITY
Start: 2018-04-14

## 2019-03-05 RX ORDER — AMLODIPINE BESYLATE 10 MG/1
10 TABLET ORAL
Refills: 0 | Status: ACTIVE | COMMUNITY
Start: 2018-04-14

## 2019-03-07 ENCOUNTER — APPOINTMENT (OUTPATIENT)
Dept: CARDIOLOGY | Facility: CLINIC | Age: 84
End: 2019-03-07

## 2019-05-02 ENCOUNTER — EMERGENCY (EMERGENCY)
Facility: HOSPITAL | Age: 84
LOS: 1 days | Discharge: HOME | End: 2019-05-02
Attending: EMERGENCY MEDICINE
Payer: MEDICAID

## 2019-05-02 VITALS
HEIGHT: 60 IN | OXYGEN SATURATION: 99 % | DIASTOLIC BLOOD PRESSURE: 70 MMHG | HEART RATE: 80 BPM | RESPIRATION RATE: 18 BRPM | SYSTOLIC BLOOD PRESSURE: 154 MMHG | WEIGHT: 139.99 LBS | TEMPERATURE: 98 F

## 2019-05-02 DIAGNOSIS — Z95.0 PRESENCE OF CARDIAC PACEMAKER: Chronic | ICD-10-CM

## 2019-05-02 PROCEDURE — 99283 EMERGENCY DEPT VISIT LOW MDM: CPT

## 2019-05-02 RX ORDER — POLYMYXIN B SULF/TRIMETHOPRIM 10000-1/ML
1 DROPS OPHTHALMIC (EYE) ONCE
Qty: 0 | Refills: 0 | Status: COMPLETED | OUTPATIENT
Start: 2019-05-02 | End: 2019-05-02

## 2019-05-02 RX ADMIN — Medication 1 DROP(S): at 10:18

## 2019-05-02 NOTE — ED STATDOCS - PMH
CAD (coronary artery disease), native coronary artery  [facility] of 9/24/2014: mLAD 40% (FFR 0.89), OM3 30%, pRCA 20%  Chronic diastolic heart failure    Depression    Hypertension    PAF (paroxysmal atrial fibrillation)    Respiratory infection

## 2019-05-02 NOTE — ED STATDOCS - CLINICAL SUMMARY MEDICAL DECISION MAKING FREE TEXT BOX
Pt presents with eye irritation, cough, and slight conjunctival injection to right eye, no evidence of preseptal or post septal cellulitis, will treat with local abx for conjunctivitis, and have pt follow up with PCP Pt presents with eye irritation, cough, and slight conjunctival injection to right eye, no evidence of preseptal or post septal cellulitis, will treat with topical abx for conjunctivitis, and have pt follow up with PCP. Given return precautions for lack or improvement or worsening symptoms.

## 2019-05-02 NOTE — ED STATDOCS - OBJECTIVE STATEMENT
88 y/o F, with hx of HTN, HLD, CAD, and osteoporosis, with pacemaker in place, presents to the ED c/o pain and swelling to bilateral eyes, onset 3 days ago.  Notes sx are worse in the right eye than the left.  Pt states "it feels like I have dirt in both of my eyes, but nothing got into my eyes".  Associated sx include productive cough, onset 1 week ago.  Denies fever, chills, chest pain, SOB, abd pain, back pain, or HA.  Denies tobacco use.  NKDA.  PCP: Dr. Butler 89 y/o F, with hx of HTN, HLD, CAD, and osteoporosis, with pacemaker in place, presents to the ED c/o pain and discomfort to bilateral eyes, onset 3 days ago.  Notes sx are worse in the right eye than the left.  Pt states "it feels like I have dirt in both of my eyes, but nothing got into my eyes".  Associated sx include productive cough, onset 1 week ago.  Denies fever, chills, chest pain, SOB, abd pain, back pain, or HA. No visual changes/ double/ vision/ itchiness. Denies tobacco use.  NKDA.  PCP: Dr. Butler

## 2019-05-02 NOTE — ED STATDOCS - ENMT, MLM
Nasal mucosa clear.  Mouth with normal mucosa  Throat has no vesicles, no swelling to posterior oropharynx, no oropharyngeal exudates and uvula is midline.

## 2020-01-17 ENCOUNTER — EMERGENCY (EMERGENCY)
Facility: HOSPITAL | Age: 85
LOS: 1 days | Discharge: HOME | End: 2020-01-17
Attending: EMERGENCY MEDICINE
Payer: MEDICAID

## 2020-01-17 VITALS
RESPIRATION RATE: 18 BRPM | SYSTOLIC BLOOD PRESSURE: 145 MMHG | DIASTOLIC BLOOD PRESSURE: 65 MMHG | HEART RATE: 82 BPM | OXYGEN SATURATION: 94 % | TEMPERATURE: 98 F

## 2020-01-17 VITALS
OXYGEN SATURATION: 96 % | HEIGHT: 61 IN | DIASTOLIC BLOOD PRESSURE: 67 MMHG | SYSTOLIC BLOOD PRESSURE: 143 MMHG | WEIGHT: 134.92 LBS | TEMPERATURE: 98 F | RESPIRATION RATE: 18 BRPM | HEART RATE: 85 BPM

## 2020-01-17 DIAGNOSIS — Z95.0 PRESENCE OF CARDIAC PACEMAKER: Chronic | ICD-10-CM

## 2020-01-17 LAB
FLU A RESULT: SIGNIFICANT CHANGE UP
FLU A RESULT: SIGNIFICANT CHANGE UP
FLUAV AG NPH QL: SIGNIFICANT CHANGE UP
FLUBV AG NPH QL: SIGNIFICANT CHANGE UP
RSV RESULT: SIGNIFICANT CHANGE UP
RSV RNA RESP QL NAA+PROBE: SIGNIFICANT CHANGE UP

## 2020-01-17 PROCEDURE — 94640 AIRWAY INHALATION TREATMENT: CPT

## 2020-01-17 PROCEDURE — 71046 X-RAY EXAM CHEST 2 VIEWS: CPT

## 2020-01-17 PROCEDURE — 71046 X-RAY EXAM CHEST 2 VIEWS: CPT | Mod: 26

## 2020-01-17 PROCEDURE — 87631 RESP VIRUS 3-5 TARGETS: CPT

## 2020-01-17 PROCEDURE — T1013: CPT

## 2020-01-17 PROCEDURE — 99285 EMERGENCY DEPT VISIT HI MDM: CPT | Mod: 25

## 2020-01-17 PROCEDURE — 99291 CRITICAL CARE FIRST HOUR: CPT

## 2020-01-17 PROCEDURE — 93010 ELECTROCARDIOGRAM REPORT: CPT

## 2020-01-17 PROCEDURE — 93005 ELECTROCARDIOGRAM TRACING: CPT

## 2020-01-17 RX ORDER — IPRATROPIUM/ALBUTEROL SULFATE 18-103MCG
3 AEROSOL WITH ADAPTER (GRAM) INHALATION ONCE
Refills: 0 | Status: COMPLETED | OUTPATIENT
Start: 2020-01-17 | End: 2020-01-17

## 2020-01-17 RX ORDER — ALBUTEROL 90 UG/1
2 AEROSOL, METERED ORAL
Qty: 1 | Refills: 0
Start: 2020-01-17 | End: 2020-01-21

## 2020-01-17 RX ADMIN — Medication 3 MILLILITER(S): at 23:01

## 2020-01-17 RX ADMIN — Medication 3 MILLILITER(S): at 20:50

## 2020-01-17 RX ADMIN — Medication 40 MILLIGRAM(S): at 20:50

## 2020-01-17 NOTE — ED PROVIDER NOTE - PATIENT PORTAL LINK FT
You can access the FollowMyHealth Patient Portal offered by [facility] by registering at the following website: [email or web address]. By joining [employer]’s FollowMyHealth portal, you will also be able to view your health information using other applications (apps) compatible with our system.

## 2020-01-17 NOTE — ED PROVIDER NOTE - NSFOLLOWUPINSTRUCTIONS_ED_ALL_ED_FT
Acute Cough    WHAT YOU NEED TO KNOW:    An acute cough can last up to 3 weeks. Common causes of an acute cough include a cold, allergies, or a lung infection.     DISCHARGE INSTRUCTIONS:    Return to the emergency department if:     You have trouble breathing or feel short of breath.      You cough up blood, or you see blood in your mucus.       You faint or feel weak or dizzy.       You have chest pain when you cough or take a deep breath.       You have new wheezing.     Contact your healthcare provider if:     You have a fever.       Your cough lasts longer than 4 weeks.       Your symptoms do not improve with treatment.       You have questions or concerns about your condition or care.     Medicines:     Medicines may be needed to stop the cough, decrease swelling in your airways, or help open your airways. Medicine may also be given to help you cough up mucus. Ask your healthcare provider what over-the-counter medicines you can take. If you have an infection caused by bacteria, you may need antibiotics.       Take your medicine as directed. Contact your healthcare provider if you think your medicine is not helping or if you have side effects. Tell him or her if you are allergic to any medicine. Keep a list of the medicines, vitamins, and herbs you take. Include the amounts, and when and why you take them. Bring the list or the pill bottles to follow-up visits. Carry your medicine list with you in case of an emergency.    Manage your symptoms:     Do not smoke and stay away from others who smoke. Nicotine and other chemicals in cigarettes and cigars can cause lung damage and make your cough worse. Ask your healthcare provider for information if you currently smoke and need help to quit. E-cigarettes or smokeless tobacco still contain nicotine. Talk to your healthcare provider before you use these products.       Drink extra liquids as directed. Liquids will help thin and loosen mucus so you can cough it up. Liquids will also help prevent dehydration. Examples of good liquids to drink include water, fruit juice, and broth. Do not drink liquids that contain caffeine. Caffeine can increase your risk for dehydration. Ask your healthcare provider how much liquid to drink each day.       Rest as directed. Do not do activities that make your cough worse, such as exercise.       Use a humidifier or vaporizer. Use a cool mist humidifier or a vaporizer to increase air moisture in your home. This may make it easier for you to breathe and help decrease your cough.       Eat 2 to 5 mL of honey 2 times each day. Honey can help thin mucus and decrease your cough.       Use cough drops or lozenges. These can help decrease throat irritation and your cough.     Follow up with your healthcare provider as directed: Write down your questions so you remember to ask them during your visits.

## 2020-01-17 NOTE — ED PROVIDER NOTE - OBJECTIVE STATEMENT
89y/o F with PMHx of CAD, CHF, PAF, HTN, Hyperlipidemia and Depression presents to the ED c/o cough, onset 8 days ago, wheezing began today. Assoc. sx of sore throat. Pt presented to PCP 5 days ago, was prescribed medication. Pt admits to not getting current flu immunization for this year. Denies nasal congestion, fever, chills, SOB, CP or LE pain. No additional complaints at this time.   : Melissa 89y/o F with PMHx of CAD, CHF, PAF, HTN, Hyperlipidemia and Depression presents to the ED c/o cough, onset 8 days ago, wheezing began today. Assoc. sx of sore throat. Pt presented to PCP 5 days ago, was prescribed medication. Pt admits to not getting current flu immunization for this year. Denies nasal congestion, fever, chills, SOB, CP or LE pain. No additional complaints at this time.  No SOB, travel, AMS, rashes, neck pain.   : Melissa

## 2020-01-17 NOTE — ED ADULT TRIAGE NOTE - CHIEF COMPLAINT QUOTE
Pt AOx3 states c/o cough x 8 days with wheezing, no acute respiratory distress present at his time. PT states per  saw PMD for cough and was given medication for it. Denies any other complaints

## 2020-01-17 NOTE — ED PROVIDER NOTE - CLINICAL SUMMARY MEDICAL DECISION MAKING FREE TEXT BOX
Pt p/w worsening cough and wheeze today. No hypoxia/ respiratory distress. Sig wheeze b/l. Will treat with steroids, Nebulizer, flu test and chest X-ray and reassess. Likely d/c with Neb

## 2020-01-17 NOTE — ED PROVIDER NOTE - PMH
CAD (coronary artery disease), native coronary artery  [facility] of 9/24/2014: mLAD 40% (FFR 0.89), OM3 30%, pRCA 20%  Chronic diastolic heart failure    Depression    Hypertension    PAF (paroxysmal atrial fibrillation)    Respiratory infection

## 2020-01-17 NOTE — ED PROVIDER NOTE - PROGRESS NOTE DETAILS
AJM: pt feeling improved. VSS no hypoxia. no distress. wheezing imporving. cxr and flu neg. dc with prednisone and albuterol. All results discussed with the patient, and a copy of results has been provided. Patient is comfortable with dc plan for home. Opportunity for questions was provided and all questions have been adressed.

## 2020-01-18 NOTE — ED ADULT NURSE REASSESSMENT NOTE - NS ED NURSE REASSESS COMMENT FT1
Pt. safe for discharge as per provider. Vital signs stable and as charted. Pt. at baseline neuro status. Discussed discharge teaching with pt, pt verbalized understanding. Pt. discharged as per orders.

## 2020-01-19 ENCOUNTER — EMERGENCY (EMERGENCY)
Facility: HOSPITAL | Age: 85
LOS: 1 days | Discharge: HOME | End: 2020-01-19
Attending: EMERGENCY MEDICINE
Payer: MEDICAID

## 2020-01-19 VITALS
DIASTOLIC BLOOD PRESSURE: 68 MMHG | TEMPERATURE: 98 F | HEIGHT: 61 IN | HEART RATE: 68 BPM | SYSTOLIC BLOOD PRESSURE: 143 MMHG | RESPIRATION RATE: 18 BRPM | OXYGEN SATURATION: 99 %

## 2020-01-19 VITALS
RESPIRATION RATE: 18 BRPM | OXYGEN SATURATION: 97 % | SYSTOLIC BLOOD PRESSURE: 141 MMHG | HEART RATE: 64 BPM | DIASTOLIC BLOOD PRESSURE: 61 MMHG

## 2020-01-19 DIAGNOSIS — Z95.0 PRESENCE OF CARDIAC PACEMAKER: Chronic | ICD-10-CM

## 2020-01-19 PROCEDURE — 99284 EMERGENCY DEPT VISIT MOD MDM: CPT | Mod: 25

## 2020-01-19 PROCEDURE — 70486 CT MAXILLOFACIAL W/O DYE: CPT

## 2020-01-19 PROCEDURE — 70450 CT HEAD/BRAIN W/O DYE: CPT | Mod: 26

## 2020-01-19 PROCEDURE — 94640 AIRWAY INHALATION TREATMENT: CPT

## 2020-01-19 PROCEDURE — 72125 CT NECK SPINE W/O DYE: CPT

## 2020-01-19 PROCEDURE — 90471 IMMUNIZATION ADMIN: CPT

## 2020-01-19 PROCEDURE — 12002 RPR S/N/AX/GEN/TRNK2.6-7.5CM: CPT

## 2020-01-19 PROCEDURE — 90715 TDAP VACCINE 7 YRS/> IM: CPT

## 2020-01-19 PROCEDURE — 71250 CT THORAX DX C-: CPT

## 2020-01-19 PROCEDURE — 72125 CT NECK SPINE W/O DYE: CPT | Mod: 26

## 2020-01-19 PROCEDURE — 70450 CT HEAD/BRAIN W/O DYE: CPT

## 2020-01-19 PROCEDURE — 71250 CT THORAX DX C-: CPT | Mod: 26

## 2020-01-19 PROCEDURE — 70486 CT MAXILLOFACIAL W/O DYE: CPT | Mod: 26

## 2020-01-19 RX ORDER — IPRATROPIUM/ALBUTEROL SULFATE 18-103MCG
3 AEROSOL WITH ADAPTER (GRAM) INHALATION ONCE
Refills: 0 | Status: COMPLETED | OUTPATIENT
Start: 2020-01-19 | End: 2020-01-19

## 2020-01-19 RX ORDER — TETANUS TOXOID, REDUCED DIPHTHERIA TOXOID AND ACELLULAR PERTUSSIS VACCINE, ADSORBED 5; 2.5; 8; 8; 2.5 [IU]/.5ML; [IU]/.5ML; UG/.5ML; UG/.5ML; UG/.5ML
0.5 SUSPENSION INTRAMUSCULAR ONCE
Refills: 0 | Status: COMPLETED | OUTPATIENT
Start: 2020-01-19 | End: 2020-01-19

## 2020-01-19 RX ADMIN — TETANUS TOXOID, REDUCED DIPHTHERIA TOXOID AND ACELLULAR PERTUSSIS VACCINE, ADSORBED 0.5 MILLILITER(S): 5; 2.5; 8; 8; 2.5 SUSPENSION INTRAMUSCULAR at 18:11

## 2020-01-19 RX ADMIN — Medication 3 MILLILITER(S): at 18:11

## 2020-01-19 NOTE — ED ADULT NURSE NOTE - NSIMPLEMENTINTERV_GEN_ALL_ED
Implemented All Fall with Harm Risk Interventions:  [place] to call system. Call bell, personal items and telephone within reach. Instruct patient to call for assistance. Room bathroom lighting operational. Non-slip footwear when patient is off stretcher. Physically safe environment: no spills, clutter or unnecessary equipment. Stretcher in lowest position, wheels locked, appropriate side rails in place. Provide visual cue, wrist band, yellow gown, etc. Monitor gait and stability. Monitor for mental status changes and reorient to person, place, and time. Review medications for side effects contributing to fall risk. Reinforce activity limits and safety measures with patient and family. Provide visual clues: red socks.

## 2020-01-19 NOTE — ED PROVIDER NOTE - CLINICAL SUMMARY MEDICAL DECISION MAKING FREE TEXT BOX
pt with mechanical fall, with mild bruising around left eye, multiple superficial abrasions without ttp, no deformity, and upper back pain. has some wheezing with recent uri but denies sob and satting 98% without retractions. lac repaired after irrigation and given wound care instrucitons to pt/family. ct head/max face, cspine and chest given upper back pain. neb for wheezing.

## 2020-01-19 NOTE — ED PROVIDER NOTE - OBJECTIVE STATEMENT
[employer]  ID [id number], and then pt family used for translation withpt consent 89 y/o female hx chf, cad, htn, paroxysmal afib not on a/c, c/o mechanical trip and fall while getting onto escalator today. No loc, esclator stopped right away. Minimal headache, thinks hit head where has abrasion/small hematoma left periorbital region, no vision changes. No neck pain. +mild upper back pain not along spine. recent uri given albuterol, denies any sob or cp, no abd pain, no extremity pain. ambulatory.     ROS: No fever/chills. No eye pain/changes in vision, No ear pain/sore throat/dysphagia, No chest pain/palpitations. No SOB/cough/. No abdominal pain, N/V/D, no black/bloody bm. No dysuria/frequency/discharge,  No Dizziness.    No numbness/tingling/weakness.

## 2020-01-19 NOTE — ED PROVIDER NOTE - PATIENT PORTAL LINK FT
You can access the FollowMyHealth Patient Portal offered by [facility] by registering at the following website: [email or web address]. By joining [employer]’s FollowMyHealth portal, you will also be able to view your health information using other applications (apps) compatible with our system.

## 2020-01-19 NOTE — ED ADULT NURSE REASSESSMENT NOTE - NS ED NURSE REASSESS COMMENT FT1
Pt care assumed from off going RN, charting as noted. Pt in no apparent distress at this time. Pt with no complaints at this time. Pt awaiting ct scan.

## 2020-01-19 NOTE — ED PROVIDER NOTE - PHYSICAL EXAMINATION
Gen: No acute distress, non toxic  HEENT: Mucous membranes moist, pink conjunctivae, EOMI. small hematoma/abasion to left periorbital region, small subconjunctival hemorrhage.  Neck: no midline ttp.   CV: RRR, nl s1/s2.  Resp: b/l wheezing, speaking full sentences, normal rate and effort  GI: Abdomen soft, NT, ND. No rebound, no guarding  : No CVAT  Neuro: A&O x 3, moving all 4 extremities  MSK: No spine or joint tenderness to palpation. nl range/strength at all extremities.   Skin: No rashes.  no bruising abrasion to left forearm/hand, right LE calf with 3 cm lac next to it (no tendon involvement)

## 2020-01-19 NOTE — ED ADULT NURSE NOTE - PMH
CAD (coronary artery disease), native coronary artery  [facility] of 9/24/2014: mLAD 40% (FFR 0.89), OM3 30%, pRCA 20%  Chronic diastolic heart failure    Depression    Hypertension    PAF (paroxysmal atrial fibrillation)    Respiratory infection

## 2020-01-19 NOTE — ED PROVIDER NOTE - PMH
CAD (coronary artery disease), native coronary artery  [facility] of 9/24/2014: mLAD 40% (FFR 0.89), OM3 30%, pRCA 20%  Chronic diastolic heart failure    Depression    Hypertension    PAF (paroxysmal atrial fibrillation)    Respiratory infection

## 2020-01-19 NOTE — ED ADULT TRIAGE NOTE - CHIEF COMPLAINT QUOTE
pt a+ox3, BIBA s/p fall while getting on escalator. pt denies hitting head or LOC, c/o pain to BL knee and wrist.

## 2020-01-19 NOTE — ED PROVIDER NOTE - PROGRESS NOTE DETAILS
Christa: lac repaired, wound care instructins given, ct head wnl. stable for d/c. results and return precautions.

## 2020-01-19 NOTE — ED PROVIDER NOTE - NSFOLLOWUPINSTRUCTIONS_ED_ALL_ED_FT
Laceration    A laceration is a cut that goes through all of the layers of the skin and into the tissue that is right under the skin. Some lacerations heal on their own. Others need to be closed with skin adhesive strips, skin glue, stitches (sutures), or staples. Proper laceration care minimizes the risk of infection and helps the laceration to heal better.  If non-absorbable stitches or staples have been placed, they must be taken out within the time frame instructed by your healthcare provider.    SEEK IMMEDIATE MEDICAL CARE IF YOU HAVE ANY OF THE FOLLOWING SYMPTOMS: swelling around the wound, worsening pain, drainage from the wound, red streaking going away from your wound, inability to move finger or toe near the laceration, or discoloration of skin near the laceration.     Closed Head Injury    A closed head injury is an injury to your head that may or may not involve a traumatic brain injury (TBI).  A CT scan of the head may not have been performed because they are usually normal after a concussion. Concussions are diagnosed and managed based on the history given and the symptoms experienced after the head injury. Most concussions do not cause serious problem and get better over several days.  Symptoms of TBI can be short or long lasting and include headache, dizziness, interference with memory or speech, fatigue, confusion, changes in sleep, mood changes, nausea, depression/anxiety, and dulling of senses. Make sure to obtain proper rest which includes getting plenty of sleep, avoiding excessive visual stimulation, and avoiding activities that may cause physical or mental stress. Avoid any situation where there is potential for another head injury, including sports.    SEEK IMMEDIATE MEDICAL CARE IF YOU HAVE ANY OF THE FOLLOWING SYMPTOMS: unusual drowsiness, vomiting, severe dizziness, seizures, lightheadedness, muscular weakness, different pupil sizes, visual changes, or clear or bloody discharge from your ears or nose.

## 2020-01-28 ENCOUNTER — EMERGENCY (EMERGENCY)
Facility: HOSPITAL | Age: 85
LOS: 1 days | Discharge: HOME | End: 2020-01-28
Attending: STUDENT IN AN ORGANIZED HEALTH CARE EDUCATION/TRAINING PROGRAM
Payer: MEDICAID

## 2020-01-28 VITALS
HEART RATE: 70 BPM | RESPIRATION RATE: 18 BRPM | HEIGHT: 61 IN | TEMPERATURE: 98 F | DIASTOLIC BLOOD PRESSURE: 69 MMHG | WEIGHT: 160.06 LBS | SYSTOLIC BLOOD PRESSURE: 143 MMHG | OXYGEN SATURATION: 98 %

## 2020-01-28 DIAGNOSIS — Z95.0 PRESENCE OF CARDIAC PACEMAKER: Chronic | ICD-10-CM

## 2020-01-28 PROCEDURE — G0463: CPT

## 2020-01-28 NOTE — ED PROVIDER NOTE - CLINICAL SUMMARY MEDICAL DECISION MAKING FREE TEXT BOX
89 yr old F presented to ED with her son for suture removal. Pt's son denies patient having any fever , chills  or increase pain at her injury site. Pt wound is clean with no signs of infection. Pt D/C and will F/U as discussed.

## 2020-01-28 NOTE — ED PROVIDER NOTE - OBJECTIVE STATEMENT
89 yr old F presented to ED with her son for suture removal. Pt's son denies patient having any fever , chills  or increase pain at her injury site. Pt denies any other issues at this time.

## 2020-01-28 NOTE — ED PROVIDER NOTE - ATTENDING CONTRIBUTION TO CARE
I performed a face to face history and physical exam of the patient and discussed their management with the resident/ACP. I reviewed the resident/ACP's note and agree with the documented findings and plan of care.    sutures removed by PA. given return precautions.

## 2020-07-27 ENCOUNTER — APPOINTMENT (OUTPATIENT)
Dept: PULMONOLOGY | Facility: CLINIC | Age: 85
End: 2020-07-27
Payer: MEDICAID

## 2020-07-27 VITALS — WEIGHT: 133 LBS | HEIGHT: 58 IN | BODY MASS INDEX: 27.92 KG/M2

## 2020-07-27 VITALS — HEART RATE: 83 BPM | OXYGEN SATURATION: 97 %

## 2020-07-27 VITALS — DIASTOLIC BLOOD PRESSURE: 60 MMHG | SYSTOLIC BLOOD PRESSURE: 110 MMHG

## 2020-07-27 PROCEDURE — 99204 OFFICE O/P NEW MOD 45 MIN: CPT

## 2020-07-27 RX ORDER — PREDNISOLONE ACETATE 10 MG/ML
1 SUSPENSION/ DROPS OPHTHALMIC
Qty: 5 | Refills: 0 | Status: DISCONTINUED | COMMUNITY
Start: 2020-03-20

## 2020-07-27 RX ORDER — OFLOXACIN 3 MG/ML
0.3 SOLUTION/ DROPS OPHTHALMIC
Qty: 5 | Refills: 0 | Status: DISCONTINUED | COMMUNITY
Start: 2020-03-20

## 2020-07-27 RX ORDER — MULTIVITAMIN WITH FOLIC ACID 400 MCG
TABLET ORAL
Qty: 30 | Refills: 0 | Status: DISCONTINUED | COMMUNITY
Start: 2020-07-24

## 2020-07-27 RX ORDER — HYDROCHLOROTHIAZIDE 25 MG/1
25 TABLET ORAL
Qty: 30 | Refills: 0 | Status: DISCONTINUED | COMMUNITY
Start: 2020-07-09

## 2020-07-27 RX ORDER — LISINOPRIL 40 MG/1
40 TABLET ORAL
Qty: 30 | Refills: 0 | Status: DISCONTINUED | COMMUNITY
Start: 2020-05-26

## 2020-07-27 RX ORDER — KETOROLAC TROMETHAMINE 5 MG/ML
0.5 SOLUTION OPHTHALMIC
Qty: 5 | Refills: 0 | Status: DISCONTINUED | COMMUNITY
Start: 2020-02-21

## 2020-07-27 RX ORDER — OFLOXACIN OTIC 3 MG/ML
0.3 SOLUTION AURICULAR (OTIC)
Qty: 5 | Refills: 0 | Status: DISCONTINUED | COMMUNITY
Start: 2020-06-04

## 2020-07-27 RX ORDER — BROMPHENIRAMINE MALEATE, PSEUDOEPHEDRINE HYDROCHLORIDE AND DEXTROMETHORPHAN HYDROBROMIDE 2; 30; 10 MG/5ML; MG/5ML; MG/5ML
30-2-10 SYRUP ORAL
Refills: 0 | Status: DISCONTINUED | COMMUNITY
Start: 2018-01-20 | End: 2020-07-27

## 2020-07-27 RX ORDER — LOSARTAN POTASSIUM 100 MG/1
100 TABLET, FILM COATED ORAL
Qty: 30 | Refills: 0 | Status: DISCONTINUED | COMMUNITY
Start: 2020-07-24

## 2020-07-27 NOTE — CONSULT LETTER
[Dear  ___] : Dear  [unfilled], [Consult Letter:] : I had the pleasure of evaluating your patient, [unfilled]. [Please see my note below.] : Please see my note below. [Consult Closing:] : Thank you very much for allowing me to participate in the care of this patient.  If you have any questions, please do not hesitate to contact me. [Sincerely,] : Sincerely, [DrLisa  ___] : Dr. SANCHEZ

## 2020-08-03 NOTE — PHYSICAL EXAM
[No Acute Distress] : no acute distress [Normal Oropharynx] : normal oropharynx [Normal Appearance] : normal appearance [No Neck Mass] : no neck mass [Clear to Auscultation Bilaterally] : clear to auscultation bilaterally [No Resp Distress] : no resp distress [No Abnormalities] : no abnormalities [Benign] : benign [Normal Gait] : normal gait [No Clubbing] : no clubbing [No Edema] : no edema [Normal Color/ Pigmentation] : normal color/ pigmentation [No Focal Deficits] : no focal deficits [Oriented x3] : oriented x3 [Normal Affect] : normal affect [TextBox_54] : Irreg

## 2020-08-03 NOTE — HISTORY OF PRESENT ILLNESS
[TextBox_4] : Elderly female with episodic disturbing cough lasting minutes\par Somewhat worse recumbent\par No exposures, carpets or pets\par New AC machine in window\par No mold\par No PND or heartburn\par Never a smoker\par No H/O asthma\par CT Chest, maxillofacial on 1/19/20 unrevealing \par Followed for CAD, afib, PPM by cardiology - not on Beta Blockers - EF normal \par

## 2020-08-28 DIAGNOSIS — Z01.818 ENCOUNTER FOR OTHER PREPROCEDURAL EXAMINATION: ICD-10-CM

## 2020-08-31 ENCOUNTER — APPOINTMENT (OUTPATIENT)
Dept: PULMONOLOGY | Facility: CLINIC | Age: 85
End: 2020-08-31

## 2020-08-31 ENCOUNTER — APPOINTMENT (OUTPATIENT)
Dept: DISASTER EMERGENCY | Facility: CLINIC | Age: 85
End: 2020-08-31

## 2020-09-01 LAB — SARS-COV-2 N GENE NPH QL NAA+PROBE: NOT DETECTED

## 2020-09-03 ENCOUNTER — APPOINTMENT (OUTPATIENT)
Dept: PULMONOLOGY | Facility: CLINIC | Age: 85
End: 2020-09-03
Payer: MEDICAID

## 2020-09-03 VITALS
HEIGHT: 57 IN | BODY MASS INDEX: 28.26 KG/M2 | SYSTOLIC BLOOD PRESSURE: 130 MMHG | HEART RATE: 83 BPM | WEIGHT: 131 LBS | DIASTOLIC BLOOD PRESSURE: 78 MMHG | OXYGEN SATURATION: 98 %

## 2020-09-03 VITALS — HEIGHT: 57 IN | WEIGHT: 131 LBS | BODY MASS INDEX: 28.26 KG/M2

## 2020-09-03 DIAGNOSIS — R07.89 OTHER CHEST PAIN: ICD-10-CM

## 2020-09-03 DIAGNOSIS — R06.00 DYSPNEA, UNSPECIFIED: ICD-10-CM

## 2020-09-03 DIAGNOSIS — R05 COUGH: ICD-10-CM

## 2020-09-03 PROCEDURE — 94010 BREATHING CAPACITY TEST: CPT

## 2020-09-03 PROCEDURE — 99214 OFFICE O/P EST MOD 30 MIN: CPT | Mod: 25

## 2020-09-03 NOTE — HISTORY OF PRESENT ILLNESS
[TextBox_4] : Elderly female with episodic disturbing cough lasting minutes\par Somewhat worse recumbent\par No exposures, carpets or pets\par New AC machine in window\par No mold\par No PND or heartburn\par Never a smoker\par No H/O asthma\par CT Chest, maxillofacial on 1/19/20 unrevealing \par Followed for CAD, afib, PPM by cardiology - not on Beta Blockers - EF normal \par \par 9/3/20\par Less cough\par Episodic\par No dysphagia but some temporal relation to eating at times \par

## 2020-09-03 NOTE — PHYSICAL EXAM
[No Acute Distress] : no acute distress [Normal Oropharynx] : normal oropharynx [Normal Appearance] : normal appearance [No Neck Mass] : no neck mass [No Resp Distress] : no resp distress [Clear to Auscultation Bilaterally] : clear to auscultation bilaterally [No Abnormalities] : no abnormalities [Benign] : benign [Normal Gait] : normal gait [No Clubbing] : no clubbing [No Edema] : no edema [Normal Color/ Pigmentation] : normal color/ pigmentation [No Focal Deficits] : no focal deficits [Oriented x3] : oriented x3 [Normal Affect] : normal affect [TextBox_54] : Irreg

## 2020-09-03 NOTE — ASSESSMENT
[FreeTextEntry1] : Relatively minor cough somewhat distressing to patient and her son\par May be related to GERD or Dysphagia

## 2020-12-11 ENCOUNTER — APPOINTMENT (OUTPATIENT)
Dept: PULMONOLOGY | Facility: CLINIC | Age: 85
End: 2020-12-11

## 2021-03-03 ENCOUNTER — EMERGENCY (EMERGENCY)
Facility: HOSPITAL | Age: 86
LOS: 1 days | Discharge: HOME | End: 2021-03-03
Payer: MEDICAID

## 2021-03-03 VITALS — HEART RATE: 90 BPM | OXYGEN SATURATION: 99 % | HEIGHT: 61 IN | RESPIRATION RATE: 18 BRPM

## 2021-03-03 DIAGNOSIS — Z95.0 PRESENCE OF CARDIAC PACEMAKER: Chronic | ICD-10-CM

## 2021-03-03 PROCEDURE — U0003: CPT

## 2021-03-03 PROCEDURE — 99282 EMERGENCY DEPT VISIT SF MDM: CPT

## 2021-03-03 PROCEDURE — 99283 EMERGENCY DEPT VISIT LOW MDM: CPT

## 2021-03-03 PROCEDURE — U0005: CPT

## 2021-03-03 NOTE — ED PROVIDER NOTE - PATIENT PORTAL LINK FT
You can access the FollowMyHealth Patient Portal offered by [facility] by registering at the following website: [email or web address]. By joining [employer]’s FollowMyHealth portal, you will also be able to view your health information using other applications (apps) compatible with our system.

## 2021-03-03 NOTE — ED PROVIDER NOTE - CLINICAL SUMMARY MEDICAL DECISION MAKING FREE TEXT BOX
Patient presents with URI symptoms, and/or fever and/or concern for COVID exposure.  As patient is nontoxic appearing. COVID (and/or  RVP)  swab(s) obtained. Advised to home quarantine until test results are available.  Advised to immediately return to the ER for worsening symptoms, including and not limited to chest pain, worsening shortness of breath, fevers not reduced with OTC antipyretic use, inabilty to tolerate food or liquid. Patient provided withwritten COVID discharge instructions and instructions on how to obtaine COVID test results.

## 2021-03-04 LAB — SARS-COV-2 RNA SPEC QL NAA+PROBE: SIGNIFICANT CHANGE UP

## 2021-06-21 NOTE — ED ADULT NURSE NOTE - MODE OF DISCHARGE
Bilateral Helical Rim Advancement Flap Text: The defect edges were debeveled with a #15 blade scalpel.  Given the location of the defect and the proximity to free margins (helical rim) a bilateral helical rim advancement flap was deemed most appropriate.  Using a sterile surgical marker, the appropriate advancement flaps were drawn incorporating the defect and placing the expected incisions between the helical rim and antihelix where possible.  The area thus outlined was incised through and through with a #15 scalpel blade.  With a skin hook and iris scissors, the flaps were gently and sharply undermined and freed up. Ambulatory

## 2021-09-10 NOTE — ED STATDOCS - NS_EDPROVIDERDISPOUSERTYPE_ED_A_ED
Patient having significant myofascial pain  Has received a series of trigger point injections with some relief    We will add some bedtime Zanaflex to see if this will help relieve the muscle tension to any additional degree Scribe Attestation (For Scribes USE Only)... Scribe Attestation (For Scribes USE Only).../Attending Attestation (For Attendings USE Only)...

## 2024-03-14 NOTE — ED PROVIDER NOTE - NS ED MD EM SELECTION
Subjective   Patient ID: Katy Henry is a 43 y.o. female.    Patient presents today with swollen lymph node on left side, pain is causing her to have a headache x 2 days      History provided by:  Patient   used: No    Earache         The following portions of the chart were reviewed this encounter and updated as appropriate:         Review of Systems   HENT:  Positive for ear pain.      Objective   Physical Exam  Procedures    Assessment/Plan      [address] Detailed

## 2024-11-21 NOTE — ED PROVIDER NOTE - DATE/TIME 1
C3 nurse spoke with Stephanie Bell for a [facility] post hospital discharge follow up call. The patient has a scheduled [facility] appointment with Blanca Ybarra NP  on 11/25/24 @ 1030.          17-Jan-2020 23:35